# Patient Record
Sex: FEMALE | Race: WHITE | NOT HISPANIC OR LATINO | Employment: UNEMPLOYED | URBAN - METROPOLITAN AREA
[De-identification: names, ages, dates, MRNs, and addresses within clinical notes are randomized per-mention and may not be internally consistent; named-entity substitution may affect disease eponyms.]

---

## 2017-11-13 ENCOUNTER — CONVERSION ENCOUNTER (OUTPATIENT)
Dept: URGENT CARE | Facility: CLINIC | Age: 22
End: 2017-11-13

## 2017-12-11 ENCOUNTER — CONVERSION ENCOUNTER (OUTPATIENT)
Dept: URGENT CARE | Facility: CLINIC | Age: 22
End: 2017-12-11

## 2019-06-04 VITALS
RESPIRATION RATE: 16 BRPM | HEIGHT: 60 IN | BODY MASS INDEX: 24.15 KG/M2 | HEART RATE: 77 BPM | SYSTOLIC BLOOD PRESSURE: 104 MMHG | SYSTOLIC BLOOD PRESSURE: 112 MMHG | BODY MASS INDEX: 23.16 KG/M2 | WEIGHT: 118 LBS | OXYGEN SATURATION: 99 % | RESPIRATION RATE: 18 BRPM | DIASTOLIC BLOOD PRESSURE: 70 MMHG | HEIGHT: 60 IN | WEIGHT: 123 LBS | HEART RATE: 67 BPM | DIASTOLIC BLOOD PRESSURE: 72 MMHG | OXYGEN SATURATION: 99 %

## 2019-12-11 ENCOUNTER — TRANSCRIBE ORDERS (OUTPATIENT)
Dept: ADMINISTRATIVE | Facility: HOSPITAL | Age: 24
End: 2019-12-11

## 2019-12-11 DIAGNOSIS — R10.2 FEMALE PELVIC PAIN: Primary | ICD-10-CM

## 2019-12-13 ENCOUNTER — HOSPITAL ENCOUNTER (OUTPATIENT)
Dept: CT IMAGING | Facility: HOSPITAL | Age: 24
Discharge: HOME | End: 2019-12-13
Admitting: OBSTETRICS & GYNECOLOGY

## 2019-12-13 DIAGNOSIS — R10.2 FEMALE PELVIC PAIN: ICD-10-CM

## 2019-12-13 PROCEDURE — 74177 CT ABD & PELVIS W/CONTRAST: CPT

## 2019-12-13 PROCEDURE — 0 IOPAMIDOL PER 1 ML: Performed by: OBSTETRICS & GYNECOLOGY

## 2019-12-13 RX ADMIN — IOPAMIDOL 100 ML: 755 INJECTION, SOLUTION INTRAVENOUS at 09:25

## 2020-11-07 ENCOUNTER — APPOINTMENT (OUTPATIENT)
Dept: ULTRASOUND IMAGING | Facility: HOSPITAL | Age: 25
End: 2020-11-07

## 2020-11-07 ENCOUNTER — HOSPITAL ENCOUNTER (EMERGENCY)
Facility: HOSPITAL | Age: 25
Discharge: HOME | End: 2020-11-07
Attending: EMERGENCY MEDICINE | Admitting: EMERGENCY MEDICINE

## 2020-11-07 VITALS
RESPIRATION RATE: 16 BRPM | HEIGHT: 60 IN | TEMPERATURE: 98.2 F | DIASTOLIC BLOOD PRESSURE: 62 MMHG | BODY MASS INDEX: 26.01 KG/M2 | OXYGEN SATURATION: 98 % | SYSTOLIC BLOOD PRESSURE: 110 MMHG | HEART RATE: 90 BPM | WEIGHT: 132.5 LBS

## 2020-11-07 DIAGNOSIS — Z3A.01 LESS THAN 8 WEEKS GESTATION OF PREGNANCY: Primary | ICD-10-CM

## 2020-11-07 DIAGNOSIS — O20.9 ANTEPARTUM BLEEDING, FIRST TRIMESTER: ICD-10-CM

## 2020-11-07 LAB
ABO GROUP BLD: NORMAL
BASOPHILS # BLD AUTO: 0 10*3/MM3 (ref 0–0.2)
BASOPHILS NFR BLD AUTO: 0.4 % (ref 0–1.5)
BILIRUB UR QL STRIP: NEGATIVE
CLARITY UR: CLEAR
COLOR UR: YELLOW
DEPRECATED RDW RBC AUTO: 46.4 FL (ref 37–54)
EOSINOPHIL # BLD AUTO: 0.2 10*3/MM3 (ref 0–0.4)
EOSINOPHIL NFR BLD AUTO: 2.1 % (ref 0.3–6.2)
ERYTHROCYTE [DISTWIDTH] IN BLOOD BY AUTOMATED COUNT: 15.2 % (ref 12.3–15.4)
GLUCOSE UR STRIP-MCNC: NEGATIVE MG/DL
HCG INTACT+B SERPL-ACNC: NORMAL MIU/ML
HCT VFR BLD AUTO: 37.6 % (ref 34–46.6)
HGB BLD-MCNC: 12.6 G/DL (ref 12–15.9)
HGB UR QL STRIP.AUTO: NEGATIVE
KETONES UR QL STRIP: NEGATIVE
LEUKOCYTE ESTERASE UR QL STRIP.AUTO: NEGATIVE
LYMPHOCYTES # BLD AUTO: 1.8 10*3/MM3 (ref 0.7–3.1)
LYMPHOCYTES NFR BLD AUTO: 21.9 % (ref 19.6–45.3)
MCH RBC QN AUTO: 29.6 PG (ref 26.6–33)
MCHC RBC AUTO-ENTMCNC: 33.6 G/DL (ref 31.5–35.7)
MCV RBC AUTO: 88.1 FL (ref 79–97)
MONOCYTES # BLD AUTO: 0.5 10*3/MM3 (ref 0.1–0.9)
MONOCYTES NFR BLD AUTO: 5.7 % (ref 5–12)
NEUTROPHILS NFR BLD AUTO: 5.6 10*3/MM3 (ref 1.7–7)
NEUTROPHILS NFR BLD AUTO: 69.9 % (ref 42.7–76)
NITRITE UR QL STRIP: NEGATIVE
NRBC BLD AUTO-RTO: 0 /100 WBC (ref 0–0.2)
PH UR STRIP.AUTO: 6.5 [PH] (ref 5–8)
PLATELET # BLD AUTO: 242 10*3/MM3 (ref 140–450)
PMV BLD AUTO: 9.2 FL (ref 6–12)
PROT UR QL STRIP: NEGATIVE
RBC # BLD AUTO: 4.27 10*6/MM3 (ref 3.77–5.28)
RH BLD: POSITIVE
SP GR UR STRIP: 1.03 (ref 1–1.03)
UROBILINOGEN UR QL STRIP: NORMAL
WBC # BLD AUTO: 8.1 10*3/MM3 (ref 3.4–10.8)

## 2020-11-07 PROCEDURE — 76817 TRANSVAGINAL US OBSTETRIC: CPT

## 2020-11-07 PROCEDURE — 84702 CHORIONIC GONADOTROPIN TEST: CPT | Performed by: EMERGENCY MEDICINE

## 2020-11-07 PROCEDURE — 76815 OB US LIMITED FETUS(S): CPT

## 2020-11-07 PROCEDURE — 86901 BLOOD TYPING SEROLOGIC RH(D): CPT | Performed by: EMERGENCY MEDICINE

## 2020-11-07 PROCEDURE — 93976 VASCULAR STUDY: CPT

## 2020-11-07 PROCEDURE — 85025 COMPLETE CBC W/AUTO DIFF WBC: CPT | Performed by: EMERGENCY MEDICINE

## 2020-11-07 PROCEDURE — P9612 CATHETERIZE FOR URINE SPEC: HCPCS

## 2020-11-07 PROCEDURE — 86900 BLOOD TYPING SEROLOGIC ABO: CPT | Performed by: EMERGENCY MEDICINE

## 2020-11-07 PROCEDURE — 81003 URINALYSIS AUTO W/O SCOPE: CPT | Performed by: EMERGENCY MEDICINE

## 2020-11-07 PROCEDURE — 99284 EMERGENCY DEPT VISIT MOD MDM: CPT

## 2020-11-07 NOTE — ED NOTES
Pt has noticed brownish red vaginal discharge when she wipes since Oct 23.   Pt has has had 2 blood draws for HCG and last around 7000.  Pt has appt w/ OBGYN 11-16-20.      Annalisa Munguia, LPN  11/07/20 1432

## 2020-11-07 NOTE — ED PROVIDER NOTES
Subjective   History of Present Illness  25-year-old female  2 para 1 Ab0 currently thinks that she is about 6 weeks pregnant and complains of pain in the right adnexal area as well as some spotting.  The patient denies any dysuria or hematuria.  She states that she has been told previously that she had a right ovarian cyst.  Patient denies any vomiting.  Review of Systems  Negative other than noted above  No past medical history on file.    No Known Allergies    No past surgical history on file.    No family history on file.    Social History     Socioeconomic History   • Marital status:      Spouse name: Not on file   • Number of children: Not on file   • Years of education: Not on file   • Highest education level: Not on file           Objective   Physical Exam  Awake and alert with stable vital signs the HEENT exam is unremarkable cardiovascular exam reveals a regular rhythm her abdomen was nondistended bowel sounds are positive I do not feel any uterine enlargement she has some tenderness in the right lower quadrant just above the inguinal ligament no hernias present she has normal external female genitalia there is a scant amount of dark blood noted in the vault cervix is closed I do not feel any uterine enlargement I do not feel any adnexal masses but she has some tenderness over the right.  There is no CVAT.  Procedures           ED Course           Results for orders placed or performed during the hospital encounter of 20   hCG, Quantitative, Pregnancy    Specimen: Blood   Result Value Ref Range    HCG Quantitative 20,279.00 mIU/mL   Urinalysis With Culture If Indicated - Urine, Catheter In/Out    Specimen: Urine, Catheter In/Out   Result Value Ref Range    Color, UA Yellow Yellow, Straw    Appearance, UA Clear Clear    pH, UA 6.5 5.0 - 8.0    Specific Gravity, UA 1.027 1.005 - 1.030    Glucose, UA Negative Negative    Ketones, UA Negative Negative    Bilirubin, UA Negative Negative     Blood, UA Negative Negative    Protein, UA Negative Negative    Leuk Esterase, UA Negative Negative    Nitrite, UA Negative Negative    Urobilinogen, UA 1.0 E.U./dL 0.2 - 1.0 E.U./dL   CBC Auto Differential    Specimen: Blood   Result Value Ref Range    WBC 8.10 3.40 - 10.80 10*3/mm3    RBC 4.27 3.77 - 5.28 10*6/mm3    Hemoglobin 12.6 12.0 - 15.9 g/dL    Hematocrit 37.6 34.0 - 46.6 %    MCV 88.1 79.0 - 97.0 fL    MCH 29.6 26.6 - 33.0 pg    MCHC 33.6 31.5 - 35.7 g/dL    RDW 15.2 12.3 - 15.4 %    RDW-SD 46.4 37.0 - 54.0 fl    MPV 9.2 6.0 - 12.0 fL    Platelets 242 140 - 450 10*3/mm3    Neutrophil % 69.9 42.7 - 76.0 %    Lymphocyte % 21.9 19.6 - 45.3 %    Monocyte % 5.7 5.0 - 12.0 %    Eosinophil % 2.1 0.3 - 6.2 %    Basophil % 0.4 0.0 - 1.5 %    Neutrophils, Absolute 5.60 1.70 - 7.00 10*3/mm3    Lymphocytes, Absolute 1.80 0.70 - 3.10 10*3/mm3    Monocytes, Absolute 0.50 0.10 - 0.90 10*3/mm3    Eosinophils, Absolute 0.20 0.00 - 0.40 10*3/mm3    Basophils, Absolute 0.00 0.00 - 0.20 10*3/mm3    nRBC 0.0 0.0 - 0.2 /100 WBC   ABO / Rh    Specimen: Blood   Result Value Ref Range    ABO Type O     RH type Positive      Medications - No data to display  Us Ob Limited 1 + Fetuses    Result Date: 11/7/2020  A small amount of free fluid within the right adnexal region likely related to mild cystic structure. Otherwise, no acute process. There is a viable intrauterine gestation corresponding to gestational age of 6 weeks and 1 day with a normal fetal heart rate of 141 bpm. No suspicious fluid collection surrounding the gestational sac.  Electronically Signed By-Kristina Jeffrey On:11/7/2020 3:47 PM This report was finalized on [id number] by  Kristina Jeffrey .    Us Ob Transvaginal    Result Date: 11/7/2020  A small amount of free fluid within the right adnexal region likely related to mild cystic structure. Otherwise, no acute process. There is a viable intrauterine gestation corresponding to gestational age of 6 weeks and 1 day with  a normal fetal heart rate of 141 bpm. No suspicious fluid collection surrounding the gestational sac.  Electronically Signed By-Kristina Jeffrey On:11/7/2020 3:47 PM This report was finalized on [id number] by  Kristina Jeffrey, .                                    MDM  Patient's ultrasound shows a 6-week 1 day intrauterine pregnancy with a fetal heart rate of 141.  The hCG is 20,279 which is appropriate blood type is O+.  Patient did have evidence of some free fluid in the right adnexal region.  She has not appointment to follow-up with her obstetrician and approximately 1 week.  Final diagnoses:   Less than 8 weeks gestation of pregnancy   Antepartum bleeding, first trimester            Chavo Manzano MD  11/07/20 3718

## 2020-11-16 LAB
EXTERNAL RUBELLA QUALITATIVE: NORMAL
EXTERNAL SYPHILIS RPR SCREEN: NORMAL

## 2021-06-10 LAB — EXTERNAL GROUP B STREP ANTIGEN: NORMAL

## 2021-07-07 ENCOUNTER — ANESTHESIA EVENT (OUTPATIENT)
Dept: LABOR AND DELIVERY | Facility: HOSPITAL | Age: 26
End: 2021-07-07

## 2021-07-07 ENCOUNTER — ANESTHESIA (OUTPATIENT)
Dept: LABOR AND DELIVERY | Facility: HOSPITAL | Age: 26
End: 2021-07-07

## 2021-07-07 ENCOUNTER — HOSPITAL ENCOUNTER (INPATIENT)
Facility: HOSPITAL | Age: 26
LOS: 1 days | Discharge: HOME | End: 2021-07-08
Attending: OBSTETRICS & GYNECOLOGY | Admitting: OBSTETRICS & GYNECOLOGY

## 2021-07-07 PROBLEM — Z34.90 PREGNANT: Status: ACTIVE | Noted: 2021-07-07

## 2021-07-07 LAB
ABO GROUP BLD: NORMAL
BLD GP AB SCN SERPL QL: NEGATIVE
DEPRECATED RDW RBC AUTO: 48.6 FL (ref 37–54)
ERYTHROCYTE [DISTWIDTH] IN BLOOD BY AUTOMATED COUNT: 15 % (ref 12.3–15.4)
HCT VFR BLD AUTO: 38.5 % (ref 34–46.6)
HGB BLD-MCNC: 12.9 G/DL (ref 12–15.9)
HIV1+2 AB SER QL: NORMAL
MCH RBC QN AUTO: 31.4 PG (ref 26.6–33)
MCHC RBC AUTO-ENTMCNC: 33.6 G/DL (ref 31.5–35.7)
MCV RBC AUTO: 93.6 FL (ref 79–97)
PLATELET # BLD AUTO: 186 10*3/MM3 (ref 140–450)
PMV BLD AUTO: 9.1 FL (ref 6–12)
RBC # BLD AUTO: 4.11 10*6/MM3 (ref 3.77–5.28)
RH BLD: POSITIVE
RPR SER QL: NORMAL
SARS-COV-2 RNA PNL SPEC NAA+PROBE: NOT DETECTED
T&S EXPIRATION DATE: NORMAL
WBC # BLD AUTO: 13.8 10*3/MM3 (ref 3.4–10.8)

## 2021-07-07 PROCEDURE — 86900 BLOOD TYPING SEROLOGIC ABO: CPT | Performed by: OBSTETRICS & GYNECOLOGY

## 2021-07-07 PROCEDURE — 86901 BLOOD TYPING SEROLOGIC RH(D): CPT | Performed by: OBSTETRICS & GYNECOLOGY

## 2021-07-07 PROCEDURE — 10907ZC DRAINAGE OF AMNIOTIC FLUID, THERAPEUTIC FROM PRODUCTS OF CONCEPTION, VIA NATURAL OR ARTIFICIAL OPENING: ICD-10-PCS | Performed by: OBSTETRICS & GYNECOLOGY

## 2021-07-07 PROCEDURE — C1755 CATHETER, INTRASPINAL: HCPCS | Performed by: ANESTHESIOLOGY

## 2021-07-07 PROCEDURE — 86850 RBC ANTIBODY SCREEN: CPT | Performed by: OBSTETRICS & GYNECOLOGY

## 2021-07-07 PROCEDURE — 86592 SYPHILIS TEST NON-TREP QUAL: CPT | Performed by: OBSTETRICS & GYNECOLOGY

## 2021-07-07 PROCEDURE — 85027 COMPLETE CBC AUTOMATED: CPT | Performed by: OBSTETRICS & GYNECOLOGY

## 2021-07-07 PROCEDURE — G0432 EIA HIV-1/HIV-2 SCREEN: HCPCS | Performed by: OBSTETRICS & GYNECOLOGY

## 2021-07-07 PROCEDURE — 87635 SARS-COV-2 COVID-19 AMP PRB: CPT | Performed by: OBSTETRICS & GYNECOLOGY

## 2021-07-07 RX ORDER — IBUPROFEN 600 MG/1
600 TABLET ORAL EVERY 6 HOURS PRN
Status: DISCONTINUED | OUTPATIENT
Start: 2021-07-07 | End: 2021-07-08 | Stop reason: HOSPADM

## 2021-07-07 RX ORDER — HYDROCORTISONE ACETATE PRAMOXINE HCL 2.5; 1 G/100G; G/100G
1 CREAM TOPICAL AS NEEDED
Status: DISCONTINUED | OUTPATIENT
Start: 2021-07-07 | End: 2021-07-08 | Stop reason: HOSPADM

## 2021-07-07 RX ORDER — AMMONIA INHALANTS 0.04 G/.3ML
1 INHALANT RESPIRATORY (INHALATION) ONCE AS NEEDED
Status: DISCONTINUED | OUTPATIENT
Start: 2021-07-07 | End: 2021-07-07

## 2021-07-07 RX ORDER — FENTANYL 0.2 MG/100ML-BUPIV 0.125%-NACL 0.9% EPIDURAL INJ 2/0.125 MCG/ML-%
SOLUTION INJECTION
Status: COMPLETED
Start: 2021-07-07 | End: 2021-07-07

## 2021-07-07 RX ORDER — DOCUSATE SODIUM 100 MG/1
100 CAPSULE, LIQUID FILLED ORAL 2 TIMES DAILY
Status: DISCONTINUED | OUTPATIENT
Start: 2021-07-07 | End: 2021-07-08 | Stop reason: HOSPADM

## 2021-07-07 RX ORDER — MISOPROSTOL 200 UG/1
800 TABLET ORAL AS NEEDED
Status: DISCONTINUED | OUTPATIENT
Start: 2021-07-07 | End: 2021-07-07 | Stop reason: HOSPADM

## 2021-07-07 RX ORDER — PRENATAL VIT/IRON FUM/FOLIC AC 27MG-0.8MG
1 TABLET ORAL DAILY
COMMUNITY

## 2021-07-07 RX ORDER — PRENATAL VIT/IRON FUM/FOLIC AC 27MG-0.8MG
1 TABLET ORAL DAILY
Status: DISCONTINUED | OUTPATIENT
Start: 2021-07-07 | End: 2021-07-08 | Stop reason: HOSPADM

## 2021-07-07 RX ORDER — MAGNESIUM CARB/ALUMINUM HYDROX 105-160MG
30 TABLET,CHEWABLE ORAL ONCE AS NEEDED
Status: DISCONTINUED | OUTPATIENT
Start: 2021-07-07 | End: 2021-07-07 | Stop reason: HOSPADM

## 2021-07-07 RX ORDER — SODIUM CHLORIDE, SODIUM LACTATE, POTASSIUM CHLORIDE, CALCIUM CHLORIDE 600; 310; 30; 20 MG/100ML; MG/100ML; MG/100ML; MG/100ML
125 INJECTION, SOLUTION INTRAVENOUS CONTINUOUS
Status: DISCONTINUED | OUTPATIENT
Start: 2021-07-07 | End: 2021-07-07

## 2021-07-07 RX ORDER — ONDANSETRON 4 MG/1
4 TABLET, FILM COATED ORAL EVERY 8 HOURS PRN
Status: DISCONTINUED | OUTPATIENT
Start: 2021-07-07 | End: 2021-07-08 | Stop reason: HOSPADM

## 2021-07-07 RX ORDER — SODIUM CHLORIDE 0.9 % (FLUSH) 0.9 %
10 SYRINGE (ML) INJECTION EVERY 12 HOURS SCHEDULED
Status: DISCONTINUED | OUTPATIENT
Start: 2021-07-07 | End: 2021-07-07 | Stop reason: HOSPADM

## 2021-07-07 RX ORDER — BISACODYL 10 MG
10 SUPPOSITORY, RECTAL RECTAL DAILY PRN
Status: DISCONTINUED | OUTPATIENT
Start: 2021-07-08 | End: 2021-07-08 | Stop reason: HOSPADM

## 2021-07-07 RX ORDER — METHYLERGONOVINE MALEATE 0.2 MG/ML
200 INJECTION INTRAVENOUS ONCE AS NEEDED
Status: DISCONTINUED | OUTPATIENT
Start: 2021-07-07 | End: 2021-07-07 | Stop reason: HOSPADM

## 2021-07-07 RX ORDER — OXYTOCIN-SODIUM CHLORIDE 0.9% IV SOLN 30 UNIT/500ML 30-0.9/5 UT/ML-%
999 SOLUTION INTRAVENOUS ONCE
Status: COMPLETED | OUTPATIENT
Start: 2021-07-07 | End: 2021-07-07

## 2021-07-07 RX ORDER — CARBOPROST TROMETHAMINE 250 UG/ML
250 INJECTION, SOLUTION INTRAMUSCULAR AS NEEDED
Status: DISCONTINUED | OUTPATIENT
Start: 2021-07-07 | End: 2021-07-07 | Stop reason: HOSPADM

## 2021-07-07 RX ORDER — FENTANYL 0.2 MG/100ML-BUPIV 0.125%-NACL 0.9% EPIDURAL INJ 2/0.125 MCG/ML-%
SOLUTION INJECTION CONTINUOUS
Status: DISCONTINUED | OUTPATIENT
Start: 2021-07-07 | End: 2021-07-07

## 2021-07-07 RX ORDER — FERROUS SULFATE 325(65) MG
325 TABLET ORAL
COMMUNITY

## 2021-07-07 RX ORDER — OXYTOCIN-SODIUM CHLORIDE 0.9% IV SOLN 30 UNIT/500ML 30-0.9/5 UT/ML-%
125 SOLUTION INTRAVENOUS CONTINUOUS PRN
Status: DISCONTINUED | OUTPATIENT
Start: 2021-07-07 | End: 2021-07-07 | Stop reason: HOSPADM

## 2021-07-07 RX ORDER — LANOLIN 100 %
OINTMENT (GRAM) TOPICAL
Status: DISCONTINUED | OUTPATIENT
Start: 2021-07-07 | End: 2021-07-08 | Stop reason: HOSPADM

## 2021-07-07 RX ORDER — LIDOCAINE HYDROCHLORIDE 10 MG/ML
30 INJECTION, SOLUTION EPIDURAL; INFILTRATION; INTRACAUDAL; PERINEURAL ONCE AS NEEDED
Status: COMPLETED | OUTPATIENT
Start: 2021-07-07 | End: 2021-07-07

## 2021-07-07 RX ORDER — OXYTOCIN-SODIUM CHLORIDE 0.9% IV SOLN 30 UNIT/500ML 30-0.9/5 UT/ML-%
250 SOLUTION INTRAVENOUS CONTINUOUS
Status: DISPENSED | OUTPATIENT
Start: 2021-07-07 | End: 2021-07-07

## 2021-07-07 RX ORDER — SODIUM CHLORIDE 0.9 % (FLUSH) 0.9 %
1-10 SYRINGE (ML) INJECTION AS NEEDED
Status: DISCONTINUED | OUTPATIENT
Start: 2021-07-07 | End: 2021-07-08 | Stop reason: HOSPADM

## 2021-07-07 RX ADMIN — SODIUM CHLORIDE, POTASSIUM CHLORIDE, SODIUM LACTATE AND CALCIUM CHLORIDE 125 ML/HR: 600; 310; 30; 20 INJECTION, SOLUTION INTRAVENOUS at 10:31

## 2021-07-07 RX ADMIN — OXYTOCIN 999 ML/HR: 10 INJECTION INTRAVENOUS at 14:56

## 2021-07-07 RX ADMIN — IBUPROFEN 600 MG: 600 TABLET, FILM COATED ORAL at 18:34

## 2021-07-07 RX ADMIN — SODIUM CHLORIDE, POTASSIUM CHLORIDE, SODIUM LACTATE AND CALCIUM CHLORIDE 125 ML/HR: 600; 310; 30; 20 INJECTION, SOLUTION INTRAVENOUS at 12:56

## 2021-07-07 RX ADMIN — OXYTOCIN 125 ML/HR: 10 INJECTION INTRAVENOUS at 16:30

## 2021-07-07 RX ADMIN — WITCH HAZEL: 500 SOLUTION RECTAL; TOPICAL at 15:53

## 2021-07-07 RX ADMIN — DOCUSATE SODIUM 100 MG: 100 CAPSULE, LIQUID FILLED ORAL at 21:20

## 2021-07-07 RX ADMIN — LIDOCAINE HYDROCHLORIDE 30 ML: 10 INJECTION, SOLUTION EPIDURAL; INFILTRATION; INTRACAUDAL; PERINEURAL at 14:40

## 2021-07-07 RX ADMIN — Medication 1 ML/HR: at 11:14

## 2021-07-07 NOTE — ANESTHESIA PROCEDURE NOTES
"Labor Epidural    Pre-sedation assessment completed: 7/7/2021 10:43 AM    Patient reassessed immediately prior to procedure    Patient location during procedure: OB  Start Time: 7/7/2021 10:43 AM  Performed By  Anesthesiologist: Shawn Prather MD  Preanesthetic Checklist  Completed: patient identified, IV checked, site marked, risks and benefits discussed, surgical consent, monitors and equipment checked, pre-op evaluation and timeout performed  Additional Notes  Tuohy Needle slipped during placement, MAHNAZ at 7 cm.  \"pop\" felt during epidural catheter placement.  CSF returned via catheter spontaneously and with syringe drawback.  Pt in severe labor pain.  1 ML test dose given.  Catheter left as intrathecal catheter.  RN and patient informed and educated about dural puncture and possible PDPH.  Will leave catheter in after delivery and infuse NS overnight.  No immediate c/o headache.  Prep:  Pt Position:sitting  Sterile Tech:gloves, cap, sterile barrier and mask  Prep:povidone-iodine 7.5% surgical scrub  Monitoring:continuous pulse oximetry, EKG and blood pressure monitoring  Epidural Block Procedure:  Approach:midline  Guidance:landmark technique and palpation technique  Location:L3-L4  Needle Type:Tuohy  Needle Gauge:17 G  Loss of Resistance Medium: saline  Loss of Resistance: 7cm  Cath Depth at skin:12 cm  Paresthesia: none  Aspiration:positive  Test Dose:positive  Med administered at 7/7/2021 10:55 AM  Number of Attempts: 1  Post Assessment:  Dressing:secured with tape and occlusive dressing applied  Pt Tolerance:patient tolerated the procedure well with no apparent complications  Complications:yes            "

## 2021-07-07 NOTE — L&D DELIVERY NOTE
[facility]  Vaginal Delivery Note    Diagnosis     Patient is a 26 y.o. female  currently at 40w3d, who presents with labor.      Delivery     Delivery:  Spontaneous Vaginal Delivery    Date of Delivery:  2021   Anesthesia: Epidural     Delivering clinician: Sheila Motley MD      Delivery narrative: This patient presented to labor and delivery in term labor.  She is GBS negative.  She had reactive category 1 tracing throughout her course.  She received an epidural at 4-1/2 cm.  The epidural did not work well.  She progressed to 7 cm was artificially ruptured and had clear fluid.  Once complete complete +2 station with the urge to push she pushed with excellent effort delivered once a second-degree episiotomy was cut a live viable female infant with Apgars of 9 and 9 at 1 and 5 minutes.  The infant had good cry color and tone was moving all 4 extremities upon delivery.  Infant was bulb suctioned after complete delivery cord was clamped and cut the infant was placed in mother's abdomen.  Delayed cord clamping was performed.  Cord blood was sent for analysis, Pitocin was given IV.  The placenta delivered spontaneously intact with a three-vessel cord was set aside.  Uterus cervix and vagina were explored and second-degree episiotomy had not extended and was repaired with 3-0 Vicryl in the normal fashion.  The patient tolerated the procedure well sponge lap needle counts were correct the fundus was firm and hemostasis noted at the end of the case mom and baby are doing well at the time of this dictation.    Infant    Findings: VFI     Apgars:  9 and 9 at 1 and 5 minutes.      Placenta, Cord, and Fluid    Placenta delivered  spontaneous  3VC          Lacerations       had a 2nd degree lacteration, which was repair. with 3.0 Vicryl rapide in the usual fashion.     Estimated Blood Loss 50cc     Complications  none    Disposition  Mother to Mother Baby/Postpartum  in stable condition currently.  Baby to  remains with mom  in stable condition currently.      Sheila Motley MD  07/07/21  15:03 EDT

## 2021-07-07 NOTE — H&P
[facility]  Obstetric History and Physical     Chief Complaint: Labor    Subjective     Patient is a 26 y.o. female  currently at 40w3d, who presents with labor pregnancy uncomplicated.    Her prenatal care is benign.  Her previous obstetric/gynecological history is noted for is non-contributory.      Prenatal Information:  Prenatal Results     POC Urine Glucose/Protein     Test Value Reference Range Date Time    Urine Glucose        Urine Protein              Initial Prenatal Labs     Test Value Reference Range Date Time    Hemoglobin  12.6 g/dL 12.0 - 15.9 20 1509    Hematocrit  37.6 % 34.0 - 46.6 20 1509    Platelets  186 10*3/mm3 140 - 450 21 1028       242 10*3/mm3 140 - 450 20 1509    Rubella IgG ^ Immune   20     Hepatitis B SAg        Hepatitis C Ab        RPR ^ Non-Reactive   20     ABO  O   21 1028    Rh  Positive   21 1028    Antibody Screen        HIV  Non-Reactive  Non-Reactive 21 1028    Urine Culture        Gonorrhea        Chlamydia        TSH              2nd and 3rd Trimester     Test Value Reference Range Date Time    Hemoglobin (repeated)  12.9 g/dL 12.0 - 15.9 21 1028    Hematocrit (repeated)  38.5 % 34.0 - 46.6 21 1028    GCT        Antibody Screen (repeated)  Negative   21 1028    GTT Fasting        GTT 1 Hr        GTT 2 Hr        GTT 3 Hr        Group B Strep ^ NEG   06/10/21           Drug Screening     Test Value Reference Range Date Time    Amphetamine Screen        Barbiturate Screen        Benzodiazepine Screen        Methadone Screen        Phencyclidine Screen        Opiates Screen        THC Screen        Cocaine Screen        Propoxyphene Screen        Buprenorphine Screen        Methamphetamine Screen        Oxycodone Screen        Tricyclic Antidepressants Screen              Other (Risk screening)     Test Value Reference Range Date Time    Varicella IgG        Parvovirus IgG        CMV IgG        Cystic  Fibrosis        Hemoglobin electrophoresis        NIPT        MSAFP-4        AFP (for NTD only)              Legend    ^: Historical                      External Prenatal Results     Pregnancy Outside Results - Transcribed From Office Records - See Scanned Records For Details     Test Value Date Time    ABO  O  21 1028    Rh  Positive  21 1028    Antibody Screen  Negative  21 1028    Varicella IgG       Rubella ^ Immune  20     Hgb  12.9 g/dL 21 1028       12.6 g/dL 20 1509    Hct  38.5 % 21 1028       37.6 % 20 1509    Glucose Fasting GTT       Glucose Tolerance Test 1 hour       Glucose Tolerance Test 3 hour       Gonorrhea (discrete)       Chlamydia (discrete)       RPR ^ Non-Reactive  20     VDRL       Syphilis Antibody       HBsAg       Herpes Simplex Virus PCR       Herpes Simplex VIrus Culture       HIV  Non-Reactive  21 1028    Hep C RNA Quant PCR       Hep C Antibody       AFP       Group B Strep ^ NEG  06/10/21     GBS Susceptibility to Clindamycin       GBS Susceptibility to Erythromycin       Fetal Fibronectin       Genetic Testing, Maternal Blood             Drug Screening     Test Value Date Time    Urine Drug Screen       Amphetamine Screen       Barbiturate Screen       Benzodiazepine Screen       Methadone Screen       Phencyclidine Screen       Opiates Screen       THC Screen       Cocaine Screen       Propoxyphene Screen       Buprenorphine Screen       Methamphetamine Screen       Oxycodone Screen       Tricyclic Antidepressants Screen             Legend    ^: Historical                         Past OB History:        Past Medical History: History reviewed. No pertinent past medical history.     Past Surgical History History reviewed. No pertinent surgical history.     Family History: No family history on file.   Social History:  reports that she has never smoked. She has never used smokeless tobacco.  Alcohol use questions  deferred to the physician.   reports no history of drug use.        General ROS: Pertinent items are noted in HPI    Objective      Vitals:     Vitals:    07/07/21 1200 07/07/21 1215 07/07/21 1230 07/07/21 1300   BP: 109/61 103/67 106/67 100/65   BP Location:       Patient Position:       Pulse: 103 81 81 83   Resp:       Temp:       TempSrc:       SpO2: 100% 100% 100%    Weight:       Height:           Fetal Heart Rate Assessment:   Category 1    [place]:   Every 2 to 3 minutes     Physical Exam:     General Appearance:    Alert, cooperative, in no acute distress   Lungs:     Clear to auscultation,respirations regular.    Heart:    Regular rhythm and normal rate.   Breast Exam:    Deferred   Abdomen:     Normal bowel sounds, no masses, soft non-tender,         non-distended, no guarding, no rebound tenderness   Pelvic Exam:   7 cm/complete, +1 station artificial rupture membranes complete without difficulty clear fluid return fetus and mother tolerated well.    Presentation: Cephalic    Cervix:    Extremities:   Moves all extremities well, no edema, no cyanosis, no              redness   Skin:   No bleeding, bruising or rash   Neurologic:   No focal neurologic defect          Laboratory Results:   Lab Results (last 48 hours)     Procedure Component Value Units Date/Time    Rubella Antibody, IgG [[id number]] Resulted: 11/16/20    Specimen: Blood Updated: 07/07/21 1133     External Rubella Qual Immune    Group B Streptococcus Culture - Swab, Vaginal/Rectum [[id number]] Resulted: 06/10/21    Specimen: Swab from Vaginal/Rectum Updated: 07/07/21 1133     External Strep Group B Ag NEG    RPR [[id number]] Resulted: 11/16/20    Specimen: Blood Updated: 07/07/21 1133     External RPR Non-Reactive    HIV-1 & HIV-2 Antibodies [[id number]]  (Normal) Collected: 07/07/21 1028    Specimen: Blood Updated: 07/07/21 1115    Narrative:      The following orders were created for panel order HIV-1 & HIV-2 Antibodies.  Procedure                                Abnormality         Status                     ---------                               -----------         ------                     HIV-1 / O / 2 Ag / Antib...[[id number]]  Normal              Final result                 Please view results for these tests on the individual orders.    HIV-1 / O / 2 Ag / Antibody 4th Generation [[id number]]  (Normal) Collected: 07/07/21 1028    Specimen: Blood Updated: 07/07/21 1115     HIV-1/ HIV-2 Non-Reactive     Comment: A non-reactive test result does not preclude the possibility of exposure to HIV or infection with HIV. An antibody response to recent exposure may take several months to reach detectable levels.       Narrative:      The HIV antibody/antigen combo assay is a qualitative assay for HIV that includes the p24 antigen as well as antibodies to HIV types 1 and 2. This test is intended to be used as a screening assay in the diagnosis of HIV infection in patients over the age of 2.  Results may be falsely decreased if patient taking Biotin.      COVID PRE-OP / PRE-PROCEDURE SCREENING ORDER (NO ISOLATION) - Swab, Nasopharynx [[id number]]  (Normal) Collected: 07/07/21 1028    Specimen: Swab from Nasopharynx Updated: 07/07/21 1100    Narrative:      The following orders were created for panel order COVID PRE-OP / PRE-PROCEDURE SCREENING ORDER (NO ISOLATION) - Swab, Nasopharynx.  Procedure                               Abnormality         Status                     ---------                               -----------         ------                     COVID-19,CEPHEID,COR/RONNIE...[[id number]]  Normal              Final result                 Please view results for these tests on the individual orders.    COVID-19,CEPHEID,COR/RONNIE/PAD/TREY IN-HOUSE(OR EMERGENT/ADD-ON),NP SWAB IN TRANSPORT MEDIA 3-4 HR TAT, RT-PCR - Swab, Nasopharynx [[id number]]  (Normal) Collected: 07/07/21 1028    Specimen: Swab from Nasopharynx Updated: 07/07/21 1100     COVID19 Not Detected     Narrative:      Fact sheet for providers: https://www.fda.gov/media/531417/download     Fact sheet for patients: https://www.fda.gov/media/258404/download  Fact sheet for providers: https://www.fda.gov/media/826164/download    Fact sheet for patients: https://www.fda.gov/media/516656/download    Test performed by PCR.    CBC (No Diff) [[id number]]  (Abnormal) Collected: 07/07/21 1028    Specimen: Blood Updated: 07/07/21 1038     WBC 13.80 10*3/mm3      RBC 4.11 10*6/mm3      Hemoglobin 12.9 g/dL      Hematocrit 38.5 %      MCV 93.6 fL      MCH 31.4 pg      MCHC 33.6 g/dL      RDW 15.0 %      RDW-SD 48.6 fl      MPV 9.1 fL      Platelets 186 10*3/mm3     RPR [[id number]] Collected: 07/07/21 1028    Specimen: Blood Updated: 07/07/21 1035          Other Studies:      Assessment/Plan     Active Problems:    Pregnant         Assessment:  1.  Intrauterine pregnancy at 40w3d gestation with reactive fetal status.    2.  labor  with ROM   3.  Obstetrical history significant for is non-contributory.  4.  GBS status:   External Strep Group B Ag   Date Value Ref Range Status   06/10/2021 NEG  Final       Plan:  1. Vaginal anticipated  2. Plan of care has been reviewed with patient.  3.  Risks, benefits of treatment plan have been discussed.  4.  All questions have been answered.  5.  Epidural in place continue to monitor.       Sheila Motley MD   7/7/2021   13:18 EDT

## 2021-07-07 NOTE — PLAN OF CARE
Goal Outcome Evaluation:  Plan of Care Reviewed With: patient        Progress: improving  Outcome Summary: Patient has effective epidural. RN encourage patient to ask any questions/concerns.

## 2021-07-07 NOTE — ANESTHESIA PREPROCEDURE EVALUATION
Anesthesia Evaluation     Patient summary reviewed and Nursing notes reviewed                Airway   Mallampati: II  TM distance: >3 FB  Neck ROM: full  No difficulty expected  Dental - normal exam     Pulmonary    Cardiovascular         Neuro/Psych  GI/Hepatic/Renal/Endo      Musculoskeletal     Abdominal    Substance History      OB/GYN    (+) Pregnant,         Other                        Anesthesia Plan    ASA 2     epidural       Anesthetic plan, all risks, benefits, and alternatives have been provided, discussed and informed consent has been obtained with: patient.

## 2021-07-08 VITALS
DIASTOLIC BLOOD PRESSURE: 65 MMHG | BODY MASS INDEX: 32.07 KG/M2 | RESPIRATION RATE: 13 BRPM | OXYGEN SATURATION: 99 % | HEART RATE: 80 BPM | WEIGHT: 163.36 LBS | SYSTOLIC BLOOD PRESSURE: 102 MMHG | HEIGHT: 60 IN | TEMPERATURE: 98.8 F

## 2021-07-08 PROBLEM — Z34.90 PREGNANT: Status: RESOLVED | Noted: 2021-07-07 | Resolved: 2021-07-08

## 2021-07-08 LAB
BASOPHILS # BLD AUTO: 0 10*3/MM3 (ref 0–0.2)
BASOPHILS NFR BLD AUTO: 0.2 % (ref 0–1.5)
DEPRECATED RDW RBC AUTO: 49.4 FL (ref 37–54)
EOSINOPHIL # BLD AUTO: 0.1 10*3/MM3 (ref 0–0.4)
EOSINOPHIL NFR BLD AUTO: 0.4 % (ref 0.3–6.2)
ERYTHROCYTE [DISTWIDTH] IN BLOOD BY AUTOMATED COUNT: 15.2 % (ref 12.3–15.4)
HCT VFR BLD AUTO: 36.5 % (ref 34–46.6)
HGB BLD-MCNC: 12.2 G/DL (ref 12–15.9)
LYMPHOCYTES # BLD AUTO: 2.1 10*3/MM3 (ref 0.7–3.1)
LYMPHOCYTES NFR BLD AUTO: 14.4 % (ref 19.6–45.3)
MCH RBC QN AUTO: 32.3 PG (ref 26.6–33)
MCHC RBC AUTO-ENTMCNC: 33.5 G/DL (ref 31.5–35.7)
MCV RBC AUTO: 96.5 FL (ref 79–97)
MONOCYTES # BLD AUTO: 0.7 10*3/MM3 (ref 0.1–0.9)
MONOCYTES NFR BLD AUTO: 4.6 % (ref 5–12)
NEUTROPHILS NFR BLD AUTO: 11.6 10*3/MM3 (ref 1.7–7)
NEUTROPHILS NFR BLD AUTO: 80.4 % (ref 42.7–76)
NRBC BLD AUTO-RTO: 0 /100 WBC (ref 0–0.2)
PLATELET # BLD AUTO: 183 10*3/MM3 (ref 140–450)
PMV BLD AUTO: 9.8 FL (ref 6–12)
RBC # BLD AUTO: 3.78 10*6/MM3 (ref 3.77–5.28)
WBC # BLD AUTO: 14.4 10*3/MM3 (ref 3.4–10.8)

## 2021-07-08 PROCEDURE — 85025 COMPLETE CBC W/AUTO DIFF WBC: CPT | Performed by: OBSTETRICS & GYNECOLOGY

## 2021-07-08 RX ORDER — IBUPROFEN 600 MG/1
600 TABLET ORAL EVERY 6 HOURS PRN
Qty: 30 TABLET | Refills: 1 | Status: SHIPPED | OUTPATIENT
Start: 2021-07-08 | End: 2021-08-16

## 2021-07-08 RX ADMIN — PRENATAL VITAMINS-IRON FUMARATE 27 MG IRON-FOLIC ACID 0.8 MG TABLET 1 TABLET: at 08:26

## 2021-07-08 RX ADMIN — DOCUSATE SODIUM 100 MG: 100 CAPSULE, LIQUID FILLED ORAL at 08:26

## 2021-07-08 NOTE — PLAN OF CARE
Goal Outcome Evaluation:   Pt pain controlled. Pt's questions answered about post vaginal delivery. Pt feels comfortable with discharge.

## 2021-07-08 NOTE — LACTATION NOTE
This note was copied from a baby's chart.  Pt denies hx of breast surgery, no allergy to wool or foods, Medela gel patches provided, instructed on use.  She has a Motif pump at home. Bf her 11 yo x 2 weeks with difficulty. Takes pnv.  Assisted to position in lt cross cradle hold, baby nursing well. Reports a recent feeding for short time in each side.  Encouraged to feed on demand. Will call for help as needed.

## 2021-07-08 NOTE — ANESTHESIA POSTPROCEDURE EVALUATION
Patient: Karen Coronado    Procedure Summary     Date: 07/07/21 Room / Location:     Anesthesia Start: 1043 Anesthesia Stop: 1447    Procedure: LABOR ANALGESIA Diagnosis:     Scheduled Providers:  Provider: Shawn Prather MD    Anesthesia Type: epidural ASA Status: 2          Anesthesia Type: epidural    Vitals  Vitals Value Taken Time   /65 07/07/21 1900   Temp 98.7 °F (37.1 °C) 07/07/21 1900   Pulse 89 07/07/21 1900   Resp 17 07/07/21 1900   SpO2 97 % 07/07/21 1900           Post Anesthesia Care and Evaluation    Patient location during evaluation: PACU  Patient participation: complete - patient participated  Level of consciousness: awake  Pain scale: See nurse's notes for pain score.  Pain management: adequate  Airway patency: patent  Anesthetic complications: No anesthetic complications  PONV Status: none  Cardiovascular status: acceptable  Respiratory status: acceptable  Hydration status: acceptable  Post Neuraxial Block status: Motor and sensory function returned to baseline and No signs or symptoms of PDPH  Comments: Patient seen and examined postoperatively; vital signs stable; SpO2 greater than or equal to 90%; cardiopulmonary status stable; nausea/vomiting adequately controlled; pain adequately controlled; no apparent anesthesia complications; patient discharged from anesthesia care when discharge criteria were met.      Intrathecal catheter left in 8 hrs after delivery.  Pt seen and examined post partum.  Pt standing up right in room, no sign of PDPH.  Intrathecal catheter bolused with 5 mL sterile NS and d/c'd.  Pt and family counseled about symptoms of PDPH and treatment options.

## 2021-07-08 NOTE — DISCHARGE SUMMARY
[facility]  Delivery Discharge Summary    Primary OB Clinician: Sheila Motley MD    Admission Diagnosis:  Active Problems:    * No active hospital problems. *  40w3d IUP  Labor    Discharge Diagnosis:  Same, delivered    Gestational Age: 40w3d    Date of Delivery: 2021     Delivered By:  Sheila Motley     Delivery Type: Vaginal, Spontaneous      Tubal Ligation: desires at 6 wks pp    Intrapartum Course: Uncomplicated delivery.     Postpartum Course:  Pt was admitted and underwent  Spontaneous Vaginal Delivery. Pt was transferred to PP where she had an uncomplicated course. Pt remained AFVSS, had scant lochia and pain was well controlled. Pt d/c home in stable condition and will f/u in office for PP visit as scheduled or PRN. Currently breastfeeding. Plans on desires BTL 6wks postpartum  for contraception.     Physical Exam:    Vitals:   Vitals:    21 2300 21 0300 21 0838 21 1132   BP: 101/66 111/71 110/74 110/70   BP Location: Left arm Left arm Left arm Left arm   Patient Position: Lying Lying Lying Lying   Pulse: 84 82 74 85   Resp: 17 16 18 14   Temp: 98.3 °F (36.8 °C) 98 °F (36.7 °C) 98.5 °F (36.9 °C) 98.5 °F (36.9 °C)   TempSrc: Oral Oral Oral Oral   SpO2: 98% 97% 98% 97%   Weight:       Height:         Temp (24hrs), Av.3 °F (36.8 °C), Min:98 °F (36.7 °C), Max:98.7 °F (37.1 °C)      General Appearance:    Alert, cooperative, in no acute distress   Abdomen:     Soft non-tender, non-distended, no guarding, no rebound         tenderness.   Extremities:   Moves all extremities well, no edema, no cyanosis, no              Redness.   Incision:   n/a   Fundus:   Firm, below umbilicus     Feeding method: Breastfeeding Status: Yes    Labs:  Results from last 7 days   Lab Units 21  1015 21  1028   WBC 10*3/mm3 14.40* 13.80*   HEMOGLOBIN g/dL 12.2 12.9   HEMATOCRIT % 36.5 38.5   PLATELETS 10*3/mm3 183 186           Blood Type: RH Positive      Plan:  Discharge to home.     Follow-up appointment with Dr Motley in 6 weeks.    Liyah Moore, APRN  7/8/2021  12:15 EDT      /d

## 2021-07-09 NOTE — SIGNIFICANT NOTE
Case Management Discharge Note           Provided Post Acute Provider List?: N/A  Provided Post Acute Provider Quality & Resource List?: N/A    Selected Continued Care - Discharged on 7/8/2021 Admission date: 7/7/2021 - Discharge disposition: Home or Self Care                   Final Discharge Disposition Code: (P) 01 - home or self-care

## 2021-07-11 ENCOUNTER — ANESTHESIA (OUTPATIENT)
Dept: EMERGENCY DEPT | Facility: HOSPITAL | Age: 26
End: 2021-07-11

## 2021-07-11 ENCOUNTER — ANESTHESIA EVENT (OUTPATIENT)
Dept: EMERGENCY DEPT | Facility: HOSPITAL | Age: 26
End: 2021-07-11

## 2021-07-11 ENCOUNTER — HOSPITAL ENCOUNTER (EMERGENCY)
Facility: HOSPITAL | Age: 26
Discharge: HOME | End: 2021-07-11
Attending: EMERGENCY MEDICINE

## 2021-07-11 VITALS
WEIGHT: 152.56 LBS | TEMPERATURE: 98.3 F | BODY MASS INDEX: 29.95 KG/M2 | DIASTOLIC BLOOD PRESSURE: 65 MMHG | SYSTOLIC BLOOD PRESSURE: 110 MMHG | OXYGEN SATURATION: 98 % | HEART RATE: 84 BPM | RESPIRATION RATE: 16 BRPM | HEIGHT: 60 IN

## 2021-07-11 LAB
BASOPHILS # BLD AUTO: 0.1 10*3/MM3 (ref 0–0.2)
BASOPHILS NFR BLD AUTO: 0.6 % (ref 0–1.5)
DEPRECATED RDW RBC AUTO: 49.9 FL (ref 37–54)
EOSINOPHIL # BLD AUTO: 0.1 10*3/MM3 (ref 0–0.4)
EOSINOPHIL NFR BLD AUTO: 0.6 % (ref 0.3–6.2)
ERYTHROCYTE [DISTWIDTH] IN BLOOD BY AUTOMATED COUNT: 15.4 % (ref 12.3–15.4)
HCT VFR BLD AUTO: 37.9 % (ref 34–46.6)
HGB BLD-MCNC: 12.9 G/DL (ref 12–15.9)
LYMPHOCYTES # BLD AUTO: 1.5 10*3/MM3 (ref 0.7–3.1)
LYMPHOCYTES NFR BLD AUTO: 12.3 % (ref 19.6–45.3)
MCH RBC QN AUTO: 32.7 PG (ref 26.6–33)
MCHC RBC AUTO-ENTMCNC: 34.1 G/DL (ref 31.5–35.7)
MCV RBC AUTO: 96 FL (ref 79–97)
MONOCYTES # BLD AUTO: 0.4 10*3/MM3 (ref 0.1–0.9)
MONOCYTES NFR BLD AUTO: 3.1 % (ref 5–12)
NEUTROPHILS NFR BLD AUTO: 10.4 10*3/MM3 (ref 1.7–7)
NEUTROPHILS NFR BLD AUTO: 83.4 % (ref 42.7–76)
NRBC BLD AUTO-RTO: 0 /100 WBC (ref 0–0.2)
PLATELET # BLD AUTO: 221 10*3/MM3 (ref 140–450)
PMV BLD AUTO: 9.2 FL (ref 6–12)
RBC # BLD AUTO: 3.94 10*6/MM3 (ref 3.77–5.28)
WBC # BLD AUTO: 12.5 10*3/MM3 (ref 3.4–10.8)

## 2021-07-11 PROCEDURE — 25010000002 DEXAMETHASONE PER 1 MG: Performed by: EMERGENCY MEDICINE

## 2021-07-11 PROCEDURE — 85025 COMPLETE CBC W/AUTO DIFF WBC: CPT | Performed by: EMERGENCY MEDICINE

## 2021-07-11 PROCEDURE — 96375 TX/PRO/DX INJ NEW DRUG ADDON: CPT

## 2021-07-11 PROCEDURE — 25010000002 ONDANSETRON PER 1 MG: Performed by: EMERGENCY MEDICINE

## 2021-07-11 PROCEDURE — 96374 THER/PROPH/DIAG INJ IV PUSH: CPT

## 2021-07-11 PROCEDURE — 99283 EMERGENCY DEPT VISIT LOW MDM: CPT

## 2021-07-11 PROCEDURE — 25010000002 HYDROMORPHONE PER 4 MG: Performed by: EMERGENCY MEDICINE

## 2021-07-11 RX ORDER — ONDANSETRON 2 MG/ML
4 INJECTION INTRAMUSCULAR; INTRAVENOUS ONCE
Status: COMPLETED | OUTPATIENT
Start: 2021-07-11 | End: 2021-07-11

## 2021-07-11 RX ORDER — HYDROMORPHONE HCL 110MG/55ML
0.5 PATIENT CONTROLLED ANALGESIA SYRINGE INTRAVENOUS ONCE
Status: COMPLETED | OUTPATIENT
Start: 2021-07-11 | End: 2021-07-11

## 2021-07-11 RX ORDER — DEXAMETHASONE SODIUM PHOSPHATE 4 MG/ML
4 INJECTION, SOLUTION INTRA-ARTICULAR; INTRALESIONAL; INTRAMUSCULAR; INTRAVENOUS; SOFT TISSUE ONCE
Status: COMPLETED | OUTPATIENT
Start: 2021-07-11 | End: 2021-07-11

## 2021-07-11 RX ADMIN — ONDANSETRON 4 MG: 2 INJECTION INTRAMUSCULAR; INTRAVENOUS at 13:34

## 2021-07-11 RX ADMIN — DEXAMETHASONE SODIUM PHOSPHATE 4 MG: 4 INJECTION, SOLUTION INTRAMUSCULAR; INTRAVENOUS at 13:33

## 2021-07-11 RX ADMIN — SODIUM CHLORIDE 1000 ML: 9 INJECTION, SOLUTION INTRAVENOUS at 13:29

## 2021-07-11 RX ADMIN — HYDROMORPHONE HYDROCHLORIDE 0.5 MG: 2 INJECTION, SOLUTION INTRAMUSCULAR; INTRAVENOUS; SUBCUTANEOUS at 13:34

## 2021-07-11 NOTE — ANESTHESIA PREPROCEDURE EVALUATION
Anesthesia Evaluation     Patient summary reviewed and Nursing notes reviewed   no history of anesthetic complications:  NPO Solid Status: > 8 hours  NPO Liquid Status: > 2 hours           Airway   Mallampati: I  TM distance: >3 FB  Neck ROM: full  No difficulty expected  Dental - normal exam     Pulmonary - negative pulmonary ROS and normal exam   Cardiovascular - negative cardio ROS and normal exam        Neuro/Psych- negative ROS  GI/Hepatic/Renal/Endo - negative ROS     Musculoskeletal (-) negative ROS    Abdominal  - normal exam   Substance History - negative use     OB/GYN      Comment:  19 WITH EPIDURAL  + DURAL PUNCTURE NOTED AT THAT TIME  NOW WITH POSTURAL HEADACHE X4 DAYS    WILL DO BLOOD PATCH      Other - negative ROS                       Anesthesia Plan    ASA 2     epidural       Anesthetic plan, all risks, benefits, and alternatives have been provided, discussed and informed consent has been obtained with: patient.

## 2021-07-11 NOTE — ED PROVIDER NOTES
Subjective   26-year-old P2 had the onset of headache after delivery on the seventh.  The patient had an epidural.  She states she was told at that time that she might have headaches after the epidural.  She states she has had no fever or chills.  She reports no leg pain or swelling.  She states that the headache increases with neck flexion or activity.  She reports she has had some intermittent nausea.  She has no history of migraines.  She reports no focal neurologic defects or lateralizing neurologic signs.  She states that she has had some vague soreness in her midline lower back          Review of Systems   HENT: Negative for trouble swallowing.    Respiratory: Negative for shortness of breath.    Cardiovascular: Negative for chest pain and palpitations.   Gastrointestinal: Negative for diarrhea and vomiting.   Genitourinary: Positive for vaginal bleeding. Negative for decreased urine volume and vaginal discharge.   Musculoskeletal: Positive for back pain and neck pain. Negative for neck stiffness.   Neurological: Negative for syncope.   Hematological: Does not bruise/bleed easily.   All other systems reviewed and are negative.      History reviewed. No pertinent past medical history.    No Known Allergies    History reviewed. No pertinent surgical history.    History reviewed. No pertinent family history.    Social History     Socioeconomic History   • Marital status:      Spouse name: Xavier   • Number of children: 1   • Years of education: Not on file   • Highest education level: Not on file   Tobacco Use   • Smoking status: Never Smoker   • Smokeless tobacco: Never Used   Substance and Sexual Activity   • Alcohol use: Defer   • Drug use: Never   • Sexual activity: Yes     Partners: Male     States that she is attempting to breast-feed but his only having colostrum so far      Objective   Physical Exam  Alert Helen Coma Scale 15   HEENT: Pupils equal and reactive to light. Conjunctivae are not  injected. normal tympanic membranes. Oropharynx and nares are normal.   Neck: Supple. Midline trachea. No JVD. No goiter.   Chest: Clear and equal breath sounds bilaterally regular rate and rhythm without murmur or rub.   Abdomen: Positive bowel sounds nontender nondistended. No rebound or peritoneal signs. No CVA tenderness.  Puncture wound in the lumbar area shows no evidence of cellulitis.  Extremities no clubbing cyanosis or edema motor sensory exam is normal the full range of motion is intact negative Kernig or Brudzinski   skin: Warm and dry, no rashes or petechia.   Lymphatic: No regional lymphadenopathy. No calf pain, swelling or Hank's sign    Procedures       Patch per anesthesia    ED Course                                   Labs Reviewed   CBC WITH AUTO DIFFERENTIAL - Abnormal; Notable for the following components:       Result Value    WBC 12.50 (*)     Neutrophil % 83.4 (*)     Lymphocyte % 12.3 (*)     Monocyte % 3.1 (*)     Neutrophils, Absolute 10.40 (*)     All other components within normal limits   CBC AND DIFFERENTIAL    Narrative:     The following orders were created for panel order CBC & Differential.  Procedure                               Abnormality         Status                     ---------                               -----------         ------                     CBC Auto Differential[[id number]]        Abnormal            Final result                 Please view results for these tests on the individual orders.     Medications   sodium chloride 0.9 % bolus 1,000 mL (1,000 mL Intravenous New Bag 7/11/21 1329)   HYDROmorphone (DILAUDID) injection 0.5 mg (0.5 mg Intravenous Given 7/11/21 1334)   ondansetron (ZOFRAN) injection 4 mg (4 mg Intravenous Given 7/11/21 1334)   dexamethasone (DECADRON) injection 4 mg (4 mg Intravenous Given 7/11/21 1333)             MDM  Number of Diagnoses or Management Options     Amount and/or Complexity of Data Reviewed  Clinical lab tests: reviewed and  ordered  Review and summarize past medical records: yes  Discuss the patient with other providers: yes    Risk of Complications, Morbidity, and/or Mortality  Presenting problems: high  Diagnostic procedures: high  Management options: high  General comments: The case was discussed with on-call anesthesia who graciously presented for blood patch.  The patient states she felt better with ER and anesthetic care.  The patient will be discharge she is encouraged to follow-up with a primary care provider the patient was stable at discharge and vocalized understanding of discharge instructions and warnings        Final diagnoses:   Postpartum spinal headache       ED Disposition  ED Disposition     ED Disposition Condition Comment    Discharge Stable           Sheila Motley MD  [address] [place] [address] [place] [id number]  [phone]               Medication List      No changes were made to your prescriptions during this visit.          Denis Dodge MD  07/11/21 [id number]

## 2021-07-11 NOTE — DISCHARGE INSTRUCTIONS
Rest, avoid exertion next 2 days  Plenty of fluids  Tylenol or ibuprofen for discomfort  Follow-up with OB/GYN or return for worsening symptoms

## 2021-07-11 NOTE — ED NOTES
Pt here c/o headache.  Pt had epidural during labor on 7/7/21 and has had a headache since.       Annalisa Munguia, LPN  07/11/21 [id number]

## 2021-07-27 ENCOUNTER — LAB REQUISITION (OUTPATIENT)
Dept: LAB | Facility: HOSPITAL | Age: 26
End: 2021-07-27

## 2021-07-27 DIAGNOSIS — O91.119: ICD-10-CM

## 2021-07-27 DIAGNOSIS — Z00.00 ENCOUNTER FOR GENERAL ADULT MEDICAL EXAMINATION WITHOUT ABNORMAL FINDINGS: ICD-10-CM

## 2021-07-27 PROCEDURE — 87205 SMEAR GRAM STAIN: CPT | Performed by: RADIOLOGY

## 2021-07-27 PROCEDURE — 87186 SC STD MICRODIL/AGAR DIL: CPT | Performed by: RADIOLOGY

## 2021-07-27 PROCEDURE — 87015 SPECIMEN INFECT AGNT CONCNTJ: CPT | Performed by: RADIOLOGY

## 2021-07-27 PROCEDURE — 88108 CYTOPATH CONCENTRATE TECH: CPT | Performed by: RADIOLOGY

## 2021-07-27 PROCEDURE — 87070 CULTURE OTHR SPECIMN AEROBIC: CPT | Performed by: RADIOLOGY

## 2021-07-27 PROCEDURE — 87077 CULTURE AEROBIC IDENTIFY: CPT | Performed by: RADIOLOGY

## 2021-07-28 ENCOUNTER — OFFICE VISIT (OUTPATIENT)
Dept: SURGERY | Facility: CLINIC | Age: 26
End: 2021-07-28

## 2021-07-28 VITALS
HEART RATE: 56 BPM | RESPIRATION RATE: 16 BRPM | HEIGHT: 60 IN | TEMPERATURE: 98.2 F | DIASTOLIC BLOOD PRESSURE: 76 MMHG | OXYGEN SATURATION: 100 % | SYSTOLIC BLOOD PRESSURE: 114 MMHG | BODY MASS INDEX: 27.48 KG/M2 | WEIGHT: 140 LBS

## 2021-07-28 DIAGNOSIS — N61.1 ABSCESS OF BREAST, RIGHT: Primary | ICD-10-CM

## 2021-07-28 PROCEDURE — 87147 CULTURE TYPE IMMUNOLOGIC: CPT | Performed by: SURGERY

## 2021-07-28 PROCEDURE — 87205 SMEAR GRAM STAIN: CPT | Performed by: SURGERY

## 2021-07-28 PROCEDURE — 87070 CULTURE OTHR SPECIMN AEROBIC: CPT | Performed by: SURGERY

## 2021-07-28 PROCEDURE — 99204 OFFICE O/P NEW MOD 45 MIN: CPT | Performed by: SURGERY

## 2021-07-28 PROCEDURE — 10060 I&D ABSCESS SIMPLE/SINGLE: CPT | Performed by: SURGERY

## 2021-07-28 RX ORDER — CEPHALEXIN 500 MG/1
CAPSULE ORAL
COMMUNITY
Start: 2021-07-26 | End: 2021-08-16

## 2021-07-28 RX ORDER — SULFAMETHOXAZOLE AND TRIMETHOPRIM 800; 160 MG/1; MG/1
1 TABLET ORAL 2 TIMES DAILY
Qty: 20 TABLET | Refills: 0 | Status: SHIPPED | OUTPATIENT
Start: 2021-07-28 | End: 2021-08-16

## 2021-07-28 NOTE — PROGRESS NOTES
GENERAL SURGERY CONSULTATION NOTE    Consult requested by: MD Tolu    Patient Care Team:  Sheila Motley MD as PCP - General (Obstetrics and Gynecology)    Reason for consult: right breast abscess    Subjective     Patient is a 26 y.o. female presents with a right breast abscess and mastitis which started about 10 days ago.  Patient states that she had her second child on 7/7/2021 and was breast-feeding until a few days prior to the onset of symptoms.  After that, she was unable to get any breast milk to expressed from the right breast which then became swollen, red, and tender.  She denies fevers.  She went to see an urgent care center who gave her a single dose of Rocephin for mastitis and Augmentin.  After this, the patient followed up with her gynecologist.  There was a palpable lump which corresponded to a 5.8 x 2.8 x 4.7 cm complex avascular fluid collection concerning for abscess or infected galactocele.  This was amenable to ultrasound-guided aspiration, and the patient reportedly had a ultrasound-guided aspiration of this area on 7/26/2021.  Approximately 20 mL of milky appearing fluid was able to be drained, however the patient still had significant swelling, pain, and tenderness.  She is now off Augmentin, and taking Keflex, but reports that this has not made much of a difference.  She is attempting to pump that side and apply warm compresses as well..     Review of Systems   Constitutional: Negative for appetite change, chills and fever.   HENT: Negative for congestion and sore throat.    Respiratory: Negative for cough and shortness of breath.    Cardiovascular: Negative for chest pain and palpitations.   Gastrointestinal: Negative for abdominal pain, constipation, diarrhea, nausea, vomiting and GERD.   Genitourinary: Positive for breast discharge, breast lump and breast pain. Negative for difficulty urinating, dysuria and frequency.   Musculoskeletal: Negative for arthralgias and back pain.    Skin: Positive for color change and rash. Negative for skin lesions.   Neurological: Negative for dizziness, seizures and memory problem.   Hematological: Negative for adenopathy. Does not bruise/bleed easily.   Psychiatric/Behavioral: Negative for sleep disturbance and depressed mood.        History  History reviewed. No pertinent past medical history.  History reviewed. No pertinent surgical history.  Family History   Problem Relation Age of Onset   • No Known Problems Father      Social History     Tobacco Use   • Smoking status: Never Smoker   • Smokeless tobacco: Never Used   Vaping Use   • Vaping Use: Never used   Substance Use Topics   • Alcohol use: Defer   • Drug use: Never     (Not in a hospital admission)    Allergies:  Patient has no known allergies.    Objective     Vital Signs  Temp:  [98.2 °F (36.8 °C)] 98.2 °F (36.8 °C)  Heart Rate:  [56] 56  Resp:  [16] 16  BP: (114)/(76) 114/76    Physical Exam  Vitals reviewed. Exam conducted with a chaperone present.   Constitutional:       Appearance: She is well-developed.   HENT:      Head: Normocephalic and atraumatic.   Eyes:      Pupils: Pupils are equal, round, and reactive to light.   Cardiovascular:      Rate and Rhythm: Normal rate and regular rhythm.   Pulmonary:      Effort: Pulmonary effort is normal.      Breath sounds: Normal breath sounds.   Chest:      Breasts:         Right: Inverted nipple, mass, skin change and tenderness present. No nipple discharge.        Comments: The right breast is markedly inflamed with erythema, seen the majority of the lower outer quadrant of the right breast.  There is skin thickening, and masslike induration of the breast tissue of the lower outer quadrant.  Abdominal:      General: There is no distension.      Palpations: Abdomen is soft.      Tenderness: There is no abdominal tenderness.      Hernia: No hernia is present.   Musculoskeletal:         General: Normal range of motion.      Cervical back: Normal  range of motion.   Lymphadenopathy:      Cervical: No cervical adenopathy.      Upper Body:      Right upper body: No supraclavicular or axillary adenopathy.   Skin:     General: Skin is warm and dry.      Findings: No rash.   Neurological:      Mental Status: She is alert and oriented to person, place, and time.         Results Review:   Lab Results (last 24 hours)     ** No results found for the last 24 hours. **        No radiology results for the last day      I reviewed the patient's new imaging results and agree with the interpretation.  I reviewed the patient's other test results and agree with the interpretation    Assessment/Plan     Active Problems:  Right breast mastitis from breast-feeding  Right breast abscess    Discussed with patient that most of the time aspiration is sufficient, however given the fluctuant mass which is reaccumulated in just 2 days, I think it would be prudent to repeat an aspiration versus incision and drainage of the right breast.  She is on Keflex right now, and review of her cultures which were obtained demonstrate that this is staph aureus which is resistant to clindamycin and erythromycin, but susceptible to Bactrim.  For description of procedure please see attached note  We are able to get copious amounts of purulent drainage out of the right breast.  This led to substantial resolution of her symptoms, however there is still a large amount of induration present.  Recommend continued warm compresses, aggressive pumping, and Bactrim for 10 days.  Patient will follow up with me in 2 weeks  Encouraged to wear sports bra for the next few days    I discussed the patients findings and my recommendations with the patient.     Klaus Neves MD  07/28/21  09:35 EDT

## 2021-07-28 NOTE — PROGRESS NOTES
Operative Note    Patient Name:  Karen Coronado  YOB: 1995    Date of Surgery:  7/28/2021      Indications: Patient is a 26-year-old lady with right breast mastitis and a right breast abscess from breast-feeding.    Pre-op Diagnosis:   Right breast abscess    Post-op Diagnosis:  Post-Op Diagnosis Codes:  Right breast abscess    Procedure/CPT® Codes:  Aspiration of right breast abscess followed by incision and drainage of right breast abscess        Staff:  MD Pura-surgeon    Anesthesia: 1% lidocaine    Estimated Blood Loss: 5 mL    Implants:    None    Specimen:          A: Right breast purulent fluid    Findings: 50 mL of purulent fluid with thick, cheesy matter expressed as well    Complications: None, immediately    Description of Procedure: After obtaining informed consent, the patient was positioned in the supine position in the procedure room.  The right breast was then prepped and draped in the usual sterile fashion.  I began by injecting local anesthesia in the periareolar area just outside of the nipple areolar complex.  I then used an 18-gauge needle and was able to express approximately 15 mL of milky appearing fluid.  This was then placed in a specimen container.  I attempted to aspirate more of the fluid, but was unsuccessful.  It was apparent that there was some cheesy, fibrinous material which was clogging the needle.  It was at this point that I offered the patient an incision and drainage which she obliged.  I then used an 11 blade to incise along the nipple areolar complex where I had infiltrated my local anesthesia.  Immediately there was a rush of purulent fluid and cheesy matter which was able to be expressed through the 1 cm opening.  Using gentle pressure, I was able to express a significant amount of fluid and cheesy appearing material.  The patient then noted resolution of her symptoms, as the pressure had greatly decreased.  We then cleaned the wound, and dressed  it with a 4 x 4 gauze and a Band-Aid.  The patient tolerated the procedure well, was able to leave the room under her own power.    Klaus Neves MD     Date: 7/28/2021  Time: 09:41 EDT

## 2021-07-29 LAB
LAB AP CASE REPORT: NORMAL
LAB AP DIAGNOSIS COMMENT: NORMAL
PATH REPORT.FINAL DX SPEC: NORMAL
PATH REPORT.GROSS SPEC: NORMAL

## 2021-07-30 LAB
BACTERIA FLD CULT: ABNORMAL
GRAM STN SPEC: ABNORMAL

## 2021-08-16 ENCOUNTER — OFFICE VISIT (OUTPATIENT)
Dept: SURGERY | Facility: CLINIC | Age: 26
End: 2021-08-16

## 2021-08-16 VITALS
BODY MASS INDEX: 27.68 KG/M2 | OXYGEN SATURATION: 100 % | TEMPERATURE: 97.8 F | HEIGHT: 60 IN | SYSTOLIC BLOOD PRESSURE: 120 MMHG | HEART RATE: 59 BPM | DIASTOLIC BLOOD PRESSURE: 80 MMHG | WEIGHT: 141 LBS

## 2021-08-16 DIAGNOSIS — N61.1 ABSCESS OF BREAST, RIGHT: Primary | ICD-10-CM

## 2021-08-16 PROCEDURE — 99213 OFFICE O/P EST LOW 20 MIN: CPT | Performed by: SURGERY

## 2021-08-17 NOTE — PROGRESS NOTES
GENERAL SURGERY CONSULTATION NOTE    Consult requested by: MD Tolu    Patient Care Team:  Sheila Motley MD as PCP - General (Obstetrics and Gynecology)    Reason for consult: right breast abscess    Subjective   From 7/28/2021:  Patient is a 26 y.o. female presents with a right breast abscess and mastitis which started about 10 days ago.  Patient states that she had her second child on 7/7/2021 and was breast-feeding until a few days prior to the onset of symptoms.  After that, she was unable to get any breast milk to expressed from the right breast which then became swollen, red, and tender.  She denies fevers.  She went to see an urgent care center who gave her a single dose of Rocephin for mastitis and Augmentin.  After this, the patient followed up with her gynecologist.  There was a palpable lump which corresponded to a 5.8 x 2.8 x 4.7 cm complex avascular fluid collection concerning for abscess or infected galactocele.  This was amenable to ultrasound-guided aspiration, and the patient reportedly had a ultrasound-guided aspiration of this area on 7/26/2021.  Approximately 20 mL of milky appearing fluid was able to be drained, however the patient still had significant swelling, pain, and tenderness.  She is now off Augmentin, and taking Keflex, but reports that this has not made much of a difference.  She is attempting to pump that side and apply warm compresses as well    From 8/16/2021:   Patient returns to clinic today after an I&D of the right breast abscess.  She reports that the amount of redness surrounding her breast has decreased significantly.  She is no longer having fevers and is off antibiotics.  She is still somewhat tender around the incision, but the amount of discomfort which she has had has greatly reduced.  She is still pumping somewhat on the left side, but has stopped pumping her right breast altogether.    Review of Systems   Constitutional: Negative for appetite change, chills  and fever.   HENT: Negative for congestion and sore throat.    Respiratory: Negative for cough and shortness of breath.    Cardiovascular: Negative for chest pain and palpitations.   Gastrointestinal: Negative for abdominal pain, constipation, diarrhea, nausea, vomiting and GERD.   Genitourinary: Positive for breast lump. Negative for breast discharge, breast pain, difficulty urinating, dysuria and frequency.   Musculoskeletal: Negative for arthralgias and back pain.   Skin: Positive for color change and rash. Negative for skin lesions.   Neurological: Negative for dizziness, seizures and memory problem.   Hematological: Negative for adenopathy. Does not bruise/bleed easily.   Psychiatric/Behavioral: Negative for sleep disturbance and depressed mood.        History  No past medical history on file.  No past surgical history on file.  Family History   Problem Relation Age of Onset   • No Known Problems Father      Social History     Tobacco Use   • Smoking status: Never Smoker   • Smokeless tobacco: Never Used   Vaping Use   • Vaping Use: Never used   Substance Use Topics   • Alcohol use: Defer   • Drug use: Never     (Not in a hospital admission)    Allergies:  Patient has no known allergies.    Objective     Vital Signs  Temp:  [97.8 °F (36.6 °C)] 97.8 °F (36.6 °C)  Heart Rate:  [59] 59  BP: (120)/(80) 120/80    Physical Exam  Vitals reviewed. Exam conducted with a chaperone present.   Constitutional:       Appearance: She is well-developed.   HENT:      Head: Normocephalic and atraumatic.   Eyes:      Pupils: Pupils are equal, round, and reactive to light.   Cardiovascular:      Rate and Rhythm: Normal rate and regular rhythm.   Pulmonary:      Effort: Pulmonary effort is normal.      Breath sounds: Normal breath sounds.   Chest:      Breasts:         Right: Mass and tenderness present. No inverted nipple, nipple discharge or skin change.        Comments: The overall amount of erythema surrounding the right breast  is reduced dramatically.  There is only some slight erythema surrounding the lateral aspect of the nipple areolar complex.  There are no fluctuant fluid collections which can be appreciated on physical exam today, but there is a significant amount of firm indurated breast tissue likely related to residual mastitis.  Abdominal:      General: There is no distension.      Palpations: Abdomen is soft.      Tenderness: There is no abdominal tenderness.      Hernia: No hernia is present.   Musculoskeletal:         General: Normal range of motion.      Cervical back: Normal range of motion.   Lymphadenopathy:      Cervical: No cervical adenopathy.      Upper Body:      Right upper body: No supraclavicular or axillary adenopathy.   Skin:     General: Skin is warm and dry.      Findings: No rash.   Neurological:      Mental Status: She is alert and oriented to person, place, and time.         Results Review:   Lab Results (last 24 hours)     ** No results found for the last 24 hours. **        No radiology results for the last day      I reviewed the patient's new imaging results and agree with the interpretation.  I reviewed the patient's other test results and agree with the interpretation    Assessment/Plan     Active Problems:  Right breast mastitis from breast-feeding  Right breast abscess    Recommend continued warm compresses to the breast to help with the residual mastitis.  Okay to stop pumping  Recommend follow-up right breast ultrasound in 3 months to ensure complete resolution of mastitis and fluid collections.  Follow-up with me after that  If there is no evidence of malignancy, patient will likely be able to resume routine screening mammography at that time as this breast abscess was related to breast-feeding.  Should the patient develop worsening erythema, pain, or swelling in the future, I instructed her to follow-up with me sooner rather than later.    I discussed the patients findings and my recommendations  with the patient.     Klaus Neves MD  08/17/21  10:06 EDT

## 2021-10-18 ENCOUNTER — LAB (OUTPATIENT)
Dept: LAB | Facility: HOSPITAL | Age: 26
End: 2021-10-18

## 2021-10-18 ENCOUNTER — TRANSCRIBE ORDERS (OUTPATIENT)
Dept: LAB | Facility: HOSPITAL | Age: 26
End: 2021-10-18

## 2021-10-18 DIAGNOSIS — Z01.818 PREOPERATIVE TESTING: ICD-10-CM

## 2021-10-18 DIAGNOSIS — Z01.818 PREOPERATIVE TESTING: Primary | ICD-10-CM

## 2021-10-18 LAB
ABO GROUP BLD: NORMAL
BASOPHILS # BLD AUTO: 0.03 10*3/MM3 (ref 0–0.2)
BASOPHILS NFR BLD AUTO: 0.5 % (ref 0–1.5)
BLD GP AB SCN SERPL QL: NEGATIVE
DEPRECATED RDW RBC AUTO: 44.6 FL (ref 37–54)
EOSINOPHIL # BLD AUTO: 0.3 10*3/MM3 (ref 0–0.4)
EOSINOPHIL NFR BLD AUTO: 4.8 % (ref 0.3–6.2)
ERYTHROCYTE [DISTWIDTH] IN BLOOD BY AUTOMATED COUNT: 12.7 % (ref 12.3–15.4)
HCT VFR BLD AUTO: 41.5 % (ref 34–46.6)
HGB BLD-MCNC: 13.2 G/DL (ref 12–15.9)
IMM GRANULOCYTES # BLD AUTO: 0.02 10*3/MM3 (ref 0–0.05)
IMM GRANULOCYTES NFR BLD AUTO: 0.3 % (ref 0–0.5)
LYMPHOCYTES # BLD AUTO: 1.85 10*3/MM3 (ref 0.7–3.1)
LYMPHOCYTES NFR BLD AUTO: 29.7 % (ref 19.6–45.3)
MCH RBC QN AUTO: 30.1 PG (ref 26.6–33)
MCHC RBC AUTO-ENTMCNC: 31.8 G/DL (ref 31.5–35.7)
MCV RBC AUTO: 94.7 FL (ref 79–97)
MONOCYTES # BLD AUTO: 0.41 10*3/MM3 (ref 0.1–0.9)
MONOCYTES NFR BLD AUTO: 6.6 % (ref 5–12)
NEUTROPHILS NFR BLD AUTO: 3.61 10*3/MM3 (ref 1.7–7)
NEUTROPHILS NFR BLD AUTO: 58.1 % (ref 42.7–76)
NRBC BLD AUTO-RTO: 0 /100 WBC (ref 0–0.2)
PLATELET # BLD AUTO: 244 10*3/MM3 (ref 140–450)
PMV BLD AUTO: 11.8 FL (ref 6–12)
RBC # BLD AUTO: 4.38 10*6/MM3 (ref 3.77–5.28)
RH BLD: POSITIVE
SARS-COV-2 ORF1AB RESP QL NAA+PROBE: NOT DETECTED
T&S EXPIRATION DATE: NORMAL
WBC # BLD AUTO: 6.22 10*3/MM3 (ref 3.4–10.8)

## 2021-10-18 PROCEDURE — 86900 BLOOD TYPING SEROLOGIC ABO: CPT

## 2021-10-18 PROCEDURE — U0004 COV-19 TEST NON-CDC HGH THRU: HCPCS

## 2021-10-18 PROCEDURE — 36415 COLL VENOUS BLD VENIPUNCTURE: CPT

## 2021-10-18 PROCEDURE — 86901 BLOOD TYPING SEROLOGIC RH(D): CPT

## 2021-10-18 PROCEDURE — 85025 COMPLETE CBC W/AUTO DIFF WBC: CPT

## 2021-10-18 PROCEDURE — C9803 HOPD COVID-19 SPEC COLLECT: HCPCS

## 2021-10-18 PROCEDURE — 86850 RBC ANTIBODY SCREEN: CPT

## 2022-12-29 ENCOUNTER — HOSPITAL ENCOUNTER (EMERGENCY)
Facility: HOSPITAL | Age: 27
Discharge: HOME | End: 2022-12-29
Attending: EMERGENCY MEDICINE | Admitting: EMERGENCY MEDICINE
Payer: COMMERCIAL

## 2022-12-29 ENCOUNTER — APPOINTMENT (OUTPATIENT)
Dept: CT IMAGING | Facility: HOSPITAL | Age: 27
End: 2022-12-29
Payer: COMMERCIAL

## 2022-12-29 ENCOUNTER — APPOINTMENT (OUTPATIENT)
Dept: GENERAL RADIOLOGY | Facility: HOSPITAL | Age: 27
End: 2022-12-29
Payer: COMMERCIAL

## 2022-12-29 VITALS
SYSTOLIC BLOOD PRESSURE: 110 MMHG | BODY MASS INDEX: 24.93 KG/M2 | DIASTOLIC BLOOD PRESSURE: 71 MMHG | TEMPERATURE: 98.3 F | OXYGEN SATURATION: 100 % | HEART RATE: 81 BPM | WEIGHT: 126.98 LBS | RESPIRATION RATE: 18 BRPM | HEIGHT: 60 IN

## 2022-12-29 DIAGNOSIS — R20.2 PARESTHESIA OF LEFT ARM AND LEG: Primary | ICD-10-CM

## 2022-12-29 DIAGNOSIS — R20.0 NUMBNESS OF LIP: ICD-10-CM

## 2022-12-29 LAB
ALBUMIN SERPL-MCNC: 4.5 G/DL (ref 3.5–5.2)
ALBUMIN/GLOB SERPL: 1.7 G/DL
ALP SERPL-CCNC: 67 U/L (ref 39–117)
ALT SERPL W P-5'-P-CCNC: 15 U/L (ref 1–33)
ANION GAP SERPL CALCULATED.3IONS-SCNC: 10 MMOL/L (ref 5–15)
AST SERPL-CCNC: 19 U/L (ref 1–32)
BASOPHILS # BLD AUTO: 0.1 10*3/MM3 (ref 0–0.2)
BASOPHILS NFR BLD AUTO: 1.2 % (ref 0–1.5)
BILIRUB SERPL-MCNC: 0.5 MG/DL (ref 0–1.2)
BUN SERPL-MCNC: 11 MG/DL (ref 6–20)
BUN/CREAT SERPL: 15.1 (ref 7–25)
CALCIUM SPEC-SCNC: 9 MG/DL (ref 8.6–10.5)
CHLORIDE SERPL-SCNC: 106 MMOL/L (ref 98–107)
CO2 SERPL-SCNC: 23 MMOL/L (ref 22–29)
CREAT SERPL-MCNC: 0.73 MG/DL (ref 0.57–1)
DEPRECATED RDW RBC AUTO: 45.5 FL (ref 37–54)
EGFRCR SERPLBLD CKD-EPI 2021: 114.3 ML/MIN/1.73
EOSINOPHIL # BLD AUTO: 0.2 10*3/MM3 (ref 0–0.4)
EOSINOPHIL NFR BLD AUTO: 2.1 % (ref 0.3–6.2)
ERYTHROCYTE [DISTWIDTH] IN BLOOD BY AUTOMATED COUNT: 14.2 % (ref 12.3–15.4)
GLOBULIN UR ELPH-MCNC: 2.6 GM/DL
GLUCOSE SERPL-MCNC: 108 MG/DL (ref 65–99)
HCT VFR BLD AUTO: 37.7 % (ref 34–46.6)
HGB BLD-MCNC: 12.7 G/DL (ref 12–15.9)
INR PPP: 1 (ref 0.93–1.1)
LYMPHOCYTES # BLD AUTO: 2.1 10*3/MM3 (ref 0.7–3.1)
LYMPHOCYTES NFR BLD AUTO: 22.5 % (ref 19.6–45.3)
MCH RBC QN AUTO: 29.7 PG (ref 26.6–33)
MCHC RBC AUTO-ENTMCNC: 33.6 G/DL (ref 31.5–35.7)
MCV RBC AUTO: 88.3 FL (ref 79–97)
MONOCYTES # BLD AUTO: 0.4 10*3/MM3 (ref 0.1–0.9)
MONOCYTES NFR BLD AUTO: 4.5 % (ref 5–12)
NEUTROPHILS NFR BLD AUTO: 6.4 10*3/MM3 (ref 1.7–7)
NEUTROPHILS NFR BLD AUTO: 69.7 % (ref 42.7–76)
NRBC BLD AUTO-RTO: 0.1 /100 WBC (ref 0–0.2)
PLATELET # BLD AUTO: 250 10*3/MM3 (ref 140–450)
PMV BLD AUTO: 9.5 FL (ref 6–12)
POTASSIUM SERPL-SCNC: 3.9 MMOL/L (ref 3.5–5.2)
PROT SERPL-MCNC: 7.1 G/DL (ref 6–8.5)
PROTHROMBIN TIME: 10.3 SECONDS (ref 9.6–11.7)
RBC # BLD AUTO: 4.27 10*6/MM3 (ref 3.77–5.28)
SODIUM SERPL-SCNC: 139 MMOL/L (ref 136–145)
TROPONIN T SERPL-MCNC: <0.01 NG/ML (ref 0–0.03)
TSH SERPL DL<=0.05 MIU/L-ACNC: 1.28 UIU/ML (ref 0.27–4.2)
WBC NRBC COR # BLD: 9.1 10*3/MM3 (ref 3.4–10.8)

## 2022-12-29 PROCEDURE — 99284 EMERGENCY DEPT VISIT MOD MDM: CPT

## 2022-12-29 PROCEDURE — 85610 PROTHROMBIN TIME: CPT | Performed by: NURSE PRACTITIONER

## 2022-12-29 PROCEDURE — 80050 GENERAL HEALTH PANEL: CPT | Performed by: NURSE PRACTITIONER

## 2022-12-29 PROCEDURE — 84484 ASSAY OF TROPONIN QUANT: CPT | Performed by: NURSE PRACTITIONER

## 2022-12-29 PROCEDURE — 70450 CT HEAD/BRAIN W/O DYE: CPT

## 2022-12-29 PROCEDURE — 71045 X-RAY EXAM CHEST 1 VIEW: CPT

## 2022-12-29 PROCEDURE — 93005 ELECTROCARDIOGRAM TRACING: CPT | Performed by: NURSE PRACTITIONER

## 2022-12-29 RX ORDER — SODIUM CHLORIDE 0.9 % (FLUSH) 0.9 %
10 SYRINGE (ML) INJECTION AS NEEDED
Status: DISCONTINUED | OUTPATIENT
Start: 2022-12-29 | End: 2022-12-29 | Stop reason: HOSPADM

## 2022-12-29 NOTE — ED PROVIDER NOTES
Subjective   History of Present Illness  27-year-old female presents with complaints of diffuse lips, left upper and lower extremity \"numbness and tingling\" since Saturday while lying in bed.  She reports intermittent lasting approximately 5 to 10 minutes.  She reports she does have some improvement with moving.  She denies associated chest pain dyspnea dizziness syncope.  Denies headache slurred speech gait difficulty.  She denies similar episode.  She denies smoking EtOH illicit drug use.    Last menstrual period 3 weeks ago, tubal ligation.    No primary care established    1. Location: Upper and lower lips, left upper extremity, left lower extremity  2. Quality: Numb, tingly  3. Severity: Moderate  4. Worsening factors: Denies  5. Alleviating factors: Movement  6. Onset: 5 days  7. Radiation: Diffuse  8. Frequency: Intermittent  9. Co-morbidities: No past medical history on file.      History provided by:  Patient   used: No        Review of Systems   Constitutional: Negative for chills, diaphoresis and fever.   HENT: Negative for ear discharge and rhinorrhea.    Eyes: Negative for photophobia, pain and visual disturbance.   Respiratory: Negative.    Cardiovascular: Negative.    Gastrointestinal: Negative.    Genitourinary: Negative.    Musculoskeletal: Negative for gait problem.   Skin: Negative for color change, pallor and rash.   Allergic/Immunologic: Negative.    Neurological: Positive for numbness. Negative for dizziness, speech difficulty, weakness and headaches.   Psychiatric/Behavioral: Negative for agitation and confusion.   All other systems reviewed and are negative.      No past medical history on file.    No Known Allergies    No past surgical history on file.    Family History   Problem Relation Age of Onset   • No Known Problems Father        Social History     Socioeconomic History   • Marital status:      Spouse name: Xavier   • Number of children: 1   Tobacco Use   •  Smoking status: Never   • Smokeless tobacco: Never   Vaping Use   • Vaping Use: Never used   Substance and Sexual Activity   • Alcohol use: Defer   • Drug use: Never   • Sexual activity: Yes     Partners: Male           Objective   Physical Exam  Vitals and nursing note reviewed.   Constitutional:       General: She is awake. She is not in acute distress.     Appearance: Normal appearance. She is well-developed and normal weight. She is not ill-appearing or diaphoretic.   HENT:      Head: Normocephalic and atraumatic.      Right Ear: External ear normal.      Left Ear: External ear normal.      Nose: Nose normal.   Eyes:      Extraocular Movements: Extraocular movements intact.      Conjunctiva/sclera: Conjunctivae normal.      Pupils: Pupils are equal, round, and reactive to light.   Neck:      Trachea: Trachea and phonation normal.      Meningeal: Brudzinski's sign and Kernig's sign absent.   Cardiovascular:      Rate and Rhythm: Normal rate and regular rhythm.      Pulses: Normal pulses.      Heart sounds: Normal heart sounds, S1 normal and S2 normal. Heart sounds not distant. No murmur heard.    No friction rub. No gallop.   Pulmonary:      Effort: Pulmonary effort is normal.      Breath sounds: Normal breath sounds and air entry.   Musculoskeletal:         General: Normal range of motion.      Cervical back: Full passive range of motion without pain, normal range of motion and neck supple. No erythema or rigidity. No spinous process tenderness. Normal range of motion.   Skin:     General: Skin is warm and dry.      Capillary Refill: Capillary refill takes less than 2 seconds.   Neurological:      Mental Status: She is alert and oriented to person, place, and time.      GCS: GCS eye subscore is 4. GCS verbal subscore is 5. GCS motor subscore is 6.      Cranial Nerves: Cranial nerves 2-12 are intact. No cranial nerve deficit.      Sensory: Sensation is intact. No sensory deficit.      Motor: Motor function is  intact. No abnormal muscle tone.      Coordination: Coordination is intact.      Gait: Gait is intact.   Psychiatric:         Mood and Affect: Mood normal.         Behavior: Behavior normal. Behavior is cooperative.         Thought Content: Thought content normal.         Judgment: Judgment normal.         Procedures     Sinus rhythm no ectopy rate of 77.  No previous available for comparison. Interpreted by Dr. Rosenberg and reviewed by me.       ED Course    CT Head Without Contrast    Result Date: 12/29/2022  No acute intracranial abnormality.  Electronically Signed By-Pancho Francisco MD On:12/29/2022 7:07 PM This report was finalized on [id number] by  Pancho Francisco MD.    XR Chest 1 View    Result Date: 12/29/2022  No acute process.  Electronically Signed By-Pancho Francisco MD On:12/29/2022 6:59 PM This report was finalized on [id number] by  Pancho Francisco MD.    Medications   sodium chloride 0.9 % flush 10 mL (has no administration in time range)     Labs Reviewed   COMPREHENSIVE METABOLIC PANEL - Abnormal; Notable for the following components:       Result Value    Glucose 108 (*)     All other components within normal limits    Narrative:     GFR Normal >60  Chronic Kidney Disease <60  Kidney Failure <15     CBC WITH AUTO DIFFERENTIAL - Abnormal; Notable for the following components:    Monocyte % 4.5 (*)     All other components within normal limits   PROTIME-INR - Normal   TROPONIN (IN-HOUSE) - Normal    Narrative:     Troponin T Reference Range:  <= 0.03 ng/mL-   Negative for AMI  >0.03 ng/mL-     Abnormal for myocardial necrosis.  Clinicians would have to utilize clinical acumen, EKG, Troponin and serial changes to determine if it is an Acute Myocardial Infarction or myocardial injury due to an underlying chronic condition.       Results may be falsely decreased if patient taking Biotin.     TSH - Normal   CBC AND DIFFERENTIAL    Narrative:     The following orders were created for panel order CBC &  Differential.  Procedure                               Abnormality         Status                     ---------                               -----------         ------                     CBC Auto Differential[[id number]]        Abnormal            Final result                 Please view results for these tests on the individual orders.                                            Medical Decision Making  Chart Review: 8/16/2021 patient was seen by general surgery for right breast abscess with mastitis.  Comorbidity: No past medical history on file.  Imaging: Was interpreted by physician and reviewed by myself: CT Head Without Contrast   Final Result    No acute intracranial abnormality.          Electronically Signed By-Pancho Francisco MD On:12/29/2022 7:07 PM    This report was finalized on [id number] by  Pancho Francisco MD.     XR Chest 1 View   Final Result    No acute process.          Electronically Signed By-Pancho Francisco MD On:12/29/2022 6:59 PM    This report was finalized on [id number] by  Pancho Francisco MD.    Patient undressed and placed in gown for exam.  Appropriate PPE worn during patient exam. Patient is alert and oriented x3. Nothing focal on exam. S1-S2 heart sounds on exam.  Lungs are clear to auscultation. No edema noted to the bilateral lower extremities.  IV established and labs obtained.  Cardiac and neuro work-ups initiated.  Chest x-ray and CT Head without obtained with the above findings. Work-up was found to be unremarkable.  Patient was given referral for primary care as well as neurology.  Upon reassessment, she is sitting upright in bed in no acute distress.  Her vital signs are stable.    Disposition/Treatment: Discussed results with patient, verbalized understanding.  Discussed reasons to return to the ER, patient verbalized understanding.  Agreeable with plan of care.  Patient was stable upon discharge.       Part of this note may be an electronic transcription/translation of spoken  language to printed text using the Dragon Dictation System.       Amount and/or Complexity of Data Reviewed  Labs: ordered. Decision-making details documented in ED Course.  Radiology: ordered. Decision-making details documented in ED Course.  ECG/medicine tests: ordered. Decision-making details documented in ED Course.      Risk  Prescription drug management.          Final diagnoses:   Paresthesia of left arm and leg   Numbness of lip       ED Disposition  ED Disposition     ED Disposition   Discharge    Condition   Stable    Comment   --             PATIENT CONNECTION - [facility] [address] [phone]  Schedule an appointment as soon as possible for a visit       Brooklynn Sebastian, APRN  [address] [id number] [phone]    Schedule an appointment as soon as possible for a visit       [facility] EMERGENCY DEPARTMENT  [address] [facility] [address] [phone]  Go to   If symptoms worsen         Medication List      No changes were made to your prescriptions during this visit.          Marianne Muller, APRN  12/29/22 2000

## 2022-12-29 NOTE — ED NOTES
Patient reports numbness in left arm and lips that occasionally is in left leg as well. Patient reports this started on Saturday. Patient denies weakness, headache, or pain. Patient denies any changes in speech or swallowing.

## 2022-12-30 LAB — QT INTERVAL: 370 MS

## 2022-12-30 NOTE — DISCHARGE INSTRUCTIONS
It is important for you to establish primary care provider for primary care needs.  Call patient connection to establish care and schedule follow-up.  Schedule appointment with neurology for further work-up.  Return to the ER for new or worsening symptoms.

## 2025-03-18 ENCOUNTER — PATIENT ROUNDING (BHMG ONLY) (OUTPATIENT)
Dept: FAMILY MEDICINE CLINIC | Facility: CLINIC | Age: 30
End: 2025-03-18
Payer: COMMERCIAL

## 2025-03-18 ENCOUNTER — OFFICE VISIT (OUTPATIENT)
Dept: FAMILY MEDICINE CLINIC | Facility: CLINIC | Age: 30
End: 2025-03-18
Payer: COMMERCIAL

## 2025-03-18 VITALS
WEIGHT: 131 LBS | OXYGEN SATURATION: 100 % | TEMPERATURE: 97.7 F | HEART RATE: 70 BPM | HEIGHT: 60 IN | BODY MASS INDEX: 25.72 KG/M2 | SYSTOLIC BLOOD PRESSURE: 118 MMHG | DIASTOLIC BLOOD PRESSURE: 74 MMHG

## 2025-03-18 DIAGNOSIS — D64.9 ANEMIA, UNSPECIFIED TYPE: ICD-10-CM

## 2025-03-18 DIAGNOSIS — Z00.00 ROUTINE GENERAL MEDICAL EXAMINATION AT A HEALTH CARE FACILITY: Primary | ICD-10-CM

## 2025-03-18 DIAGNOSIS — Z12.4 SCREENING FOR CERVICAL CANCER: ICD-10-CM

## 2025-03-18 PROCEDURE — 99385 PREV VISIT NEW AGE 18-39: CPT | Performed by: NURSE PRACTITIONER

## 2025-03-18 NOTE — PROGRESS NOTES
Subjective   Karen Coronado is a 29 y.o. female presents for   Chief Complaint   Patient presents with    [facility]       Health Maintenance Due   Topic Date Due    BMI FOLLOWUP  Never done    HEPATITIS C SCREENING  Never done    ANNUAL PHYSICAL  Never done    PAP SMEAR  Never done       History of Present Illness  The patient is a 29-year-old female who presents to [facility].    She has expressed interest in undergoing a Pap smear during this visit. Her previous gynecological care was provided by [facility] in [place], where she initially consulted with a physician before transitioning to a nurse practitioner due to the physician's departure. She has no history of abnormal Pap smears. She is not currently using any form of birth control, having undergone a tubal ligation following the birth of her child. This is her only surgical history. She reports no significant health issues in the past.    Her last laboratory tests were conducted during an emergency room visit in 2022, at which time she was experiencing numbness and tingling. An MRI was scheduled but not performed due to scheduling conflicts. The symptoms have since resolved and have not recurred.    She has been receiving healthcare services through a free clinic associated with her partner's workplace, where her iron levels were monitored every 6 weeks due to low levels. Her liver enzymes were also found to be elevated. It has been approximately 6 months since her last iron level check. She experiences persistent coldness in her hands and feet. She was prescribed an iron supplement but admits to inconsistent use due to a general aversion to medication.    She has not received any COVID-19 vaccines. She is up to date on all other vaccines and does not want influenza vaccine today.    MEDICATIONS  Iron supplement (noncompliant)    IMMUNIZATIONS  She has not received any COVID-19 vaccines. She is up to date on all other vaccines and  "does not want influenza vaccine today.    Vitals:    03/18/25 1312   BP: 118/74   BP Location: Right arm   Patient Position: Sitting   Cuff Size: Adult   Pulse: 70   Temp: 97.7 °F (36.5 °C)   TempSrc: Infrared   SpO2: 100%   Weight: 59.4 kg (131 lb)   Height: 152.4 cm (60\")     Body mass index is 25.58 kg/m².    Current Outpatient Medications on File Prior to Visit   Medication Sig Dispense Refill    ferrous sulfate 325 (65 FE) MG tablet Take 1 tablet by mouth Daily With Breakfast.      [DISCONTINUED] Prenatal Vit-Fe Fumarate-FA (prenatal vitamin 27-0.8) 27-0.8 MG tablet tablet Take 1 tablet by mouth Daily. (Patient not taking: Reported on 3/18/2025)       No current facility-administered medications on file prior to visit.       The following portions of the patient's history were reviewed and updated as appropriate: allergies, current medications, past family history, past medical history, past social history, past surgical history, and problem list.    Review of Systems    Objective   Physical Exam  Vitals and nursing note reviewed.   Constitutional:       Appearance: Normal appearance. She is well-developed.   HENT:      Head: Normocephalic and atraumatic.      Right Ear: External ear normal.      Left Ear: External ear normal.      Nose: Nose normal.   Eyes:      Extraocular Movements: Extraocular movements intact.      Pupils: Pupils are equal, round, and reactive to light.   Cardiovascular:      Rate and Rhythm: Normal rate and regular rhythm.      Pulses: Normal pulses.      Heart sounds: Normal heart sounds.   Pulmonary:      Effort: Pulmonary effort is normal.      Breath sounds: Normal breath sounds.   Chest:   Breasts:     Right: Normal.      Left: Normal.   Abdominal:      General: Bowel sounds are normal.      Palpations: Abdomen is soft.      Hernia: There is no hernia in the left inguinal area or right inguinal area.   Genitourinary:     Exam position: Lithotomy position.      Vagina: Normal.      " Cervix: Normal.      Uterus: Normal.       Adnexa: Right adnexa normal and left adnexa normal.      Rectum: Normal.   Musculoskeletal:         General: Normal range of motion.      Cervical back: Normal range of motion and neck supple.   Lymphadenopathy:      Lower Body: No right inguinal adenopathy. No left inguinal adenopathy.   Skin:     General: Skin is warm and dry.   Neurological:      General: No focal deficit present.      Mental Status: She is alert and oriented to person, place, and time.   Psychiatric:         Mood and Affect: Mood normal.         Behavior: Behavior normal.         Judgment: Judgment normal.              PHQ-9 Total Score:      Results      Assessment & Plan   Diagnoses and all orders for this visit:    1. Routine general medical examination at a health care facility (Primary)  Comments:  Patient counseled on importance of balanced diet and routine exercise as well as risk and benefits of current recommended vaccines.  Orders:  -     CBC Auto Differential  -     Comprehensive Metabolic Panel  -     Lipid Panel  -     TSH    2. Anemia, unspecified type  -     Iron and TIBC    3. Screening for cervical cancer  -     IGP,Aptima HPV,Age Gdln         1. Health maintenance.  A comprehensive set of laboratory tests will be conducted today, including CBC, CMP, cholesterol, thyroid, and iron levels. A Pap smear will also be performed during this visit.    2. Low iron levels.  She reported previously low iron levels and inconsistent use of iron supplements. Iron levels will be checked today. If results indicate low iron, she will be advised to resume iron supplementation and discuss strategies to improve adherence.    3. Encounter for immunization.  She has not received any COVID-19 vaccines. She is up to date on all other vaccines and does not want influenza vaccine today. She was informed that COVID-19 vaccines are available at the health department if she decides to get  vaccinated.    Follow-up  The patient will follow up in 1 year for annual physical or sooner if needed.    PROCEDURE  The patient underwent a tubal ligation following the birth of her child.    There are no Patient Instructions on file for this visit.         Patient or patient representative verbalized consent for the use of Ambient Listening during the visit with  ENRIQUE Raphael for chart documentation. 3/18/2025  14:24 EDT

## 2025-03-18 NOTE — PROGRESS NOTES
March 18, 2025    Hello, may I speak with Karen Coronado?    My name is FADI      I am  with [facility]  [address]  [place] [address].    Before we get started may I verify your date of birth? 1995    I am calling to officially welcome you to our practice and ask about your recent visit. Is this a good time to talk? yes    Tell me about your visit with us. What things went well?  VISIT WAS GOOD       We're always looking for ways to make our patients' experiences even better. Do you have recommendations on ways we may improve?  no    Overall were you satisfied with your first visit to our practice? yes       I appreciate you taking the time to speak with me today. Is there anything else I can do for you? no      Thank you, and have a great day.

## 2025-03-19 LAB
ALBUMIN SERPL-MCNC: 4.5 G/DL (ref 3.9–4.9)
ALP SERPL-CCNC: 62 IU/L (ref 44–121)
ALT SERPL-CCNC: 10 IU/L (ref 0–32)
AST SERPL-CCNC: 19 IU/L (ref 0–40)
BASOPHILS # BLD AUTO: 0 X10E3/UL (ref 0–0.2)
BASOPHILS NFR BLD AUTO: 1 %
BILIRUB SERPL-MCNC: 0.9 MG/DL (ref 0–1.2)
BUN SERPL-MCNC: 7 MG/DL (ref 6–20)
BUN/CREAT SERPL: 11 (ref 9–23)
CALCIUM SERPL-MCNC: 8.9 MG/DL (ref 8.7–10.2)
CHLORIDE SERPL-SCNC: 105 MMOL/L (ref 96–106)
CHOLEST SERPL-MCNC: 163 MG/DL (ref 100–199)
CO2 SERPL-SCNC: 19 MMOL/L (ref 20–29)
CREAT SERPL-MCNC: 0.65 MG/DL (ref 0.57–1)
EGFRCR SERPLBLD CKD-EPI 2021: 121 ML/MIN/1.73
EOSINOPHIL # BLD AUTO: 0.1 X10E3/UL (ref 0–0.4)
EOSINOPHIL NFR BLD AUTO: 1 %
ERYTHROCYTE [DISTWIDTH] IN BLOOD BY AUTOMATED COUNT: 12.3 % (ref 11.7–15.4)
GLOBULIN SER CALC-MCNC: 2.9 G/DL (ref 1.5–4.5)
GLUCOSE SERPL-MCNC: 86 MG/DL (ref 70–99)
HCT VFR BLD AUTO: 36.4 % (ref 34–46.6)
HDLC SERPL-MCNC: 60 MG/DL
HGB BLD-MCNC: 11.6 G/DL (ref 11.1–15.9)
IMM GRANULOCYTES # BLD AUTO: 0 X10E3/UL (ref 0–0.1)
IMM GRANULOCYTES NFR BLD AUTO: 0 %
IRON SATN MFR SERPL: 11 % (ref 15–55)
IRON SERPL-MCNC: 47 UG/DL (ref 27–159)
LDLC SERPL CALC-MCNC: 92 MG/DL (ref 0–99)
LYMPHOCYTES # BLD AUTO: 1.9 X10E3/UL (ref 0.7–3.1)
LYMPHOCYTES NFR BLD AUTO: 24 %
MCH RBC QN AUTO: 28.8 PG (ref 26.6–33)
MCHC RBC AUTO-ENTMCNC: 31.9 G/DL (ref 31.5–35.7)
MCV RBC AUTO: 90 FL (ref 79–97)
MONOCYTES # BLD AUTO: 0.4 X10E3/UL (ref 0.1–0.9)
MONOCYTES NFR BLD AUTO: 6 %
NEUTROPHILS # BLD AUTO: 5.5 X10E3/UL (ref 1.4–7)
NEUTROPHILS NFR BLD AUTO: 68 %
PLATELET # BLD AUTO: 250 X10E3/UL (ref 150–450)
POTASSIUM SERPL-SCNC: 4.5 MMOL/L (ref 3.5–5.2)
PROT SERPL-MCNC: 7.4 G/DL (ref 6–8.5)
RBC # BLD AUTO: 4.03 X10E6/UL (ref 3.77–5.28)
SODIUM SERPL-SCNC: 138 MMOL/L (ref 134–144)
TIBC SERPL-MCNC: 425 UG/DL (ref 250–450)
TRIGL SERPL-MCNC: 51 MG/DL (ref 0–149)
TSH SERPL DL<=0.005 MIU/L-ACNC: 1.63 UIU/ML (ref 0.45–4.5)
UIBC SERPL-MCNC: 378 UG/DL (ref 131–425)
VLDLC SERPL CALC-MCNC: 11 MG/DL (ref 5–40)
WBC # BLD AUTO: 8 X10E3/UL (ref 3.4–10.8)

## 2025-03-21 LAB
AGE GDLN ACOG TESTING: NORMAL
CYTOLOGIST CVX/VAG CYTO: NORMAL
CYTOLOGY CVX/VAG DOC CYTO: NORMAL
CYTOLOGY CVX/VAG DOC THIN PREP: NORMAL
DX ICD CODE: NORMAL
HPV GENOTYPE REFLEX: NORMAL
HPV I/H RISK 4 DNA CVX QL PROBE+SIG AMP: NEGATIVE
OTHER STN SPEC: NORMAL
SERVICE CMNT-IMP: NORMAL
STAT OF ADQ CVX/VAG CYTO-IMP: NORMAL

## 2025-08-01 ENCOUNTER — TELEPHONE (OUTPATIENT)
Dept: FAMILY MEDICINE CLINIC | Facility: CLINIC | Age: 30
End: 2025-08-01
Payer: COMMERCIAL

## 2025-08-01 ENCOUNTER — OFFICE VISIT (OUTPATIENT)
Dept: FAMILY MEDICINE CLINIC | Facility: CLINIC | Age: 30
End: 2025-08-01
Payer: COMMERCIAL

## 2025-08-01 VITALS
OXYGEN SATURATION: 99 % | HEART RATE: 76 BPM | WEIGHT: 131 LBS | SYSTOLIC BLOOD PRESSURE: 119 MMHG | HEIGHT: 60 IN | DIASTOLIC BLOOD PRESSURE: 76 MMHG | BODY MASS INDEX: 25.72 KG/M2 | TEMPERATURE: 96.9 F

## 2025-08-01 DIAGNOSIS — H93.8X1 SENSATION OF FULLNESS IN RIGHT EAR: ICD-10-CM

## 2025-08-01 DIAGNOSIS — D64.9 ANEMIA, UNSPECIFIED TYPE: ICD-10-CM

## 2025-08-01 DIAGNOSIS — R20.0 NUMBNESS OF LIP: Primary | ICD-10-CM

## 2025-08-01 DIAGNOSIS — R00.2 PALPITATIONS: ICD-10-CM

## 2025-08-01 RX ORDER — CETIRIZINE HYDROCHLORIDE 10 MG/1
10 TABLET ORAL DAILY
Qty: 30 TABLET | Refills: 2 | Status: SHIPPED | OUTPATIENT
Start: 2025-08-01

## 2025-08-01 RX ORDER — FLUTICASONE PROPIONATE 50 MCG
2 SPRAY, SUSPENSION (ML) NASAL DAILY
Qty: 16 G | Refills: 2 | Status: SHIPPED | OUTPATIENT
Start: 2025-08-01

## 2025-08-01 NOTE — TELEPHONE ENCOUNTER
"Patient called in and said that she is having face numbness and her left arm feels \"weird\". She also stated when she lays down she's having heart palpitations. She also stated that she felt dizzy earlier. Advised ER with those symptoms.  "

## 2025-08-01 NOTE — PROGRESS NOTES
Chief Complaint  Numbness and Ear Fullness    Lisandro Coronado presents to [facility]    History of Present Illness  The patient presents for evaluation of numbness and tingling in her upper lip and right nostril, clogged right ear, chest fluttering, and lightheadedness.    She has been experiencing intermittent numbness and tingling in her upper lip and right nostril for the past few weeks. There is no facial numbness or tingling. Additionally, she reports a sensation of clogging in her right ear but has no history of ear infections. She does not take any allergy medications and does not use Flonase. She had been experiencing pressure on the left side of her head, which has since resolved. Clear nasal drainage occurs every morning. There is no abnormal fatigue. She reports no dental issues and uses a nightly retainer. She has been using Invisalign for several years and does not believe it is causing her lip numbness.    She describes a fluttering sensation in the middle of her chest, primarily occurring at night when she lies down. This morning, she felt lightheaded and nauseous. There is no unusual stress or anxiety. There is no chest pain or heaviness. She is unaware of any heart conditions. She is currently taking a multivitamin with iron due to previously low iron levels. She is unsure about her magnesium or vitamin levels. She has been attempting to reduce her caffeine intake by alternating days without coffee, which seems to have helped with her palpitations. She last consumed coffee on 07/26/2025. She rarely drinks soda or energy drinks.    She had an episode of numbness in her left arm in 2022 or 2023, which was evaluated and found to be non-concerning. Prior to these symptoms, she experienced an eyelid twitch for 3 weeks, which has since resolved.    Social History:  Coffee/Tea/Caffeine-containing Drinks: She consumes coffee, attempting to reduce  "intake by alternating days without it.    Results      Ear Fullness  Associated symptoms: congestion        Objective   Vital Signs:  /76 (BP Location: Left arm, Patient Position: Sitting, Cuff Size: Adult)   Pulse 76   Temp 96.9 °F (36.1 °C) (Infrared)   Ht 152.4 cm (60\")   Wt 59.4 kg (131 lb)   SpO2 99%   BMI 25.58 kg/m²   Estimated body mass index is 25.58 kg/m² as calculated from the following:    Height as of this encounter: 152.4 cm (60\").    Weight as of this encounter: 59.4 kg (131 lb).          Review of Systems   HENT:  Positive for congestion.         Ear fullness  Upper lip numbness    Cardiovascular:  Positive for palpitations.        But has not had palpation x 1 week since cutting down on the caffeine    Neurological:  Positive for light-headedness.   All other systems reviewed and are negative.       Physical Exam  Vitals reviewed.   Constitutional:       Appearance: Normal appearance. She is normal weight.   HENT:      Right Ear: Tympanic membrane, ear canal and external ear normal.      Left Ear: Tympanic membrane, ear canal and external ear normal.      Ears:      Comments: Patient has feeling of fullness in her ear membrane itself in both ears both look good canal looks good external ear looks good no signs of ear infection or bulging of the eardrums     Nose: Congestion present.      Mouth/Throat:      Mouth: Mucous membranes are moist.      Pharynx: Oropharynx is clear.   Cardiovascular:      Rate and Rhythm: Normal rate and regular rhythm.      Pulses: Normal pulses.      Heart sounds: Normal heart sounds.   Pulmonary:      Effort: Pulmonary effort is normal.      Breath sounds: Normal breath sounds.   Skin:     General: Skin is warm and dry.   Neurological:      Mental Status: She is alert.   Psychiatric:         Mood and Affect: Mood normal.         Behavior: Behavior normal.         Thought Content: Thought content normal.         Judgment: Judgment normal.          Physical " Exam  Ears: Eardrum appears sunken in, suggesting eustachian tube dysfunction.  Respiratory: Clear to auscultation, no wheezing, rales or rhonchi    Result Review :           ECG 12 Lead    Date/Time: 8/1/2025 12:27 PM  Performed by: Kesha Yuan PA-C    Authorized by: Kesha Yuan PA-C  Comparison: not compared with previous ECG   Rhythm: sinus rhythm  Rate: normal  Conduction: conduction normal  ST Segments: ST segments normal  T Waves: T waves normal  QRS axis: normal  Other: no other findings    Clinical impression: normal ECG            Assessment and Plan   Diagnoses and all orders for this visit:    1. Numbness of lip (Primary)  -     CBC & Differential  -     Comprehensive Metabolic Panel  -     Magnesium  -     Ferritin    2. Anemia, unspecified type  -     CBC & Differential  -     Iron and TIBC  -     Comprehensive Metabolic Panel  -     Ferritin    3. Sensation of fullness in right ear  -     cetirizine (ZyrTEC Allergy) 10 MG tablet; Take 1 tablet by mouth Daily.  Dispense: 30 tablet; Refill: 2  -     fluticasone (FLONASE) 50 MCG/ACT nasal spray; Administer 2 sprays into the nostril(s) as directed by provider Daily.  Dispense: 16 g; Refill: 2    Other orders  -     ECG 12 Lead             Assessment & Plan  1. Eustachian tube dysfunction.  - Symptoms suggest possible eustachian tube dysfunction, likely due to pressure behind the ears.  - Physical exam revealed a slightly sunken eardrum, indicating pressure behind the ear.  - Daily use of Flonase, 2 sprays in each nostril, and cetirizine (Zyrtec) was recommended to help open up the eustachian tubes and equalize pressure.  - Prescriptions for Flonase and cetirizine were sent to the pharmacy. Adequate hydration was emphasized, with a minimum intake of 64 ounces of water daily.    2. Chest fluttering.  - The chest fluttering experienced at night may be related to caffeine intake.  - An EKG was performed today, which returned normal results.  -  Blood work, including CBC, CMP, ferritin, iron, and magnesium levels, will be conducted.  - Patient advised to monitor caffeine intake and reduce consumption to alleviate symptoms.    Follow-up: A follow-up visit is scheduled in 2 weeks.          Follow Up   Return in about 2 weeks (around 8/15/2025) for Recheck ear and numbness .  Patient was given instructions and counseling regarding her condition or for health maintenance advice. Please see specific information pulled into the AVS if appropriate.         This note has been electronically signed.   Kesha Yuan PA-C 12:28 EDT 08/01/25   Patient or patient representative verbalized consent for the use of Ambient Listening during the visit with  Kesha Yuan PA-C for chart documentation. 8/1/2025  12:28 EDT    The encounter note is created with the use of AI technology.  I do apologize if there are typos and/or confusion within the note.  Please feel free to contact me or my office with any questions or concerns.

## 2025-08-02 LAB
ALBUMIN SERPL-MCNC: 4.5 G/DL (ref 3.9–4.9)
ALP SERPL-CCNC: 61 IU/L (ref 44–121)
ALT SERPL-CCNC: 8 IU/L (ref 0–32)
AST SERPL-CCNC: 14 IU/L (ref 0–40)
BASOPHILS # BLD AUTO: 0 X10E3/UL (ref 0–0.2)
BASOPHILS NFR BLD AUTO: 1 %
BILIRUB SERPL-MCNC: 1.2 MG/DL (ref 0–1.2)
BUN SERPL-MCNC: 7 MG/DL (ref 6–20)
BUN/CREAT SERPL: 11 (ref 9–23)
CALCIUM SERPL-MCNC: 9.4 MG/DL (ref 8.7–10.2)
CHLORIDE SERPL-SCNC: 104 MMOL/L (ref 96–106)
CO2 SERPL-SCNC: 22 MMOL/L (ref 20–29)
CREAT SERPL-MCNC: 0.63 MG/DL (ref 0.57–1)
EGFRCR SERPLBLD CKD-EPI 2021: 121 ML/MIN/1.73
EOSINOPHIL # BLD AUTO: 0.1 X10E3/UL (ref 0–0.4)
EOSINOPHIL NFR BLD AUTO: 1 %
ERYTHROCYTE [DISTWIDTH] IN BLOOD BY AUTOMATED COUNT: 13.3 % (ref 11.7–15.4)
FERRITIN SERPL-MCNC: 20 NG/ML (ref 15–150)
GLOBULIN SER CALC-MCNC: 2.7 G/DL (ref 1.5–4.5)
GLUCOSE SERPL-MCNC: 92 MG/DL (ref 70–99)
HCT VFR BLD AUTO: 39 % (ref 34–46.6)
HGB BLD-MCNC: 12.6 G/DL (ref 11.1–15.9)
IMM GRANULOCYTES # BLD AUTO: 0 X10E3/UL (ref 0–0.1)
IMM GRANULOCYTES NFR BLD AUTO: 0 %
IRON SATN MFR SERPL: 35 % (ref 15–55)
IRON SERPL-MCNC: 124 UG/DL (ref 27–159)
LYMPHOCYTES # BLD AUTO: 1.2 X10E3/UL (ref 0.7–3.1)
LYMPHOCYTES NFR BLD AUTO: 21 %
MAGNESIUM SERPL-MCNC: 2.1 MG/DL (ref 1.6–2.3)
MCH RBC QN AUTO: 30.3 PG (ref 26.6–33)
MCHC RBC AUTO-ENTMCNC: 32.3 G/DL (ref 31.5–35.7)
MCV RBC AUTO: 94 FL (ref 79–97)
MONOCYTES # BLD AUTO: 0.3 X10E3/UL (ref 0.1–0.9)
MONOCYTES NFR BLD AUTO: 5 %
NEUTROPHILS # BLD AUTO: 4.2 X10E3/UL (ref 1.4–7)
NEUTROPHILS NFR BLD AUTO: 72 %
PLATELET # BLD AUTO: 243 X10E3/UL (ref 150–450)
POTASSIUM SERPL-SCNC: 4.3 MMOL/L (ref 3.5–5.2)
PROT SERPL-MCNC: 7.2 G/DL (ref 6–8.5)
RBC # BLD AUTO: 4.16 X10E6/UL (ref 3.77–5.28)
SODIUM SERPL-SCNC: 140 MMOL/L (ref 134–144)
TIBC SERPL-MCNC: 354 UG/DL (ref 250–450)
UIBC SERPL-MCNC: 230 UG/DL (ref 131–425)
WBC # BLD AUTO: 5.8 X10E3/UL (ref 3.4–10.8)

## 2025-08-15 ENCOUNTER — OFFICE VISIT (OUTPATIENT)
Dept: FAMILY MEDICINE CLINIC | Facility: CLINIC | Age: 30
End: 2025-08-15
Payer: COMMERCIAL

## 2025-08-15 VITALS
DIASTOLIC BLOOD PRESSURE: 78 MMHG | TEMPERATURE: 98 F | HEIGHT: 60 IN | HEART RATE: 69 BPM | WEIGHT: 134 LBS | SYSTOLIC BLOOD PRESSURE: 114 MMHG | OXYGEN SATURATION: 98 % | BODY MASS INDEX: 26.31 KG/M2

## 2025-08-15 DIAGNOSIS — R20.0 NUMBNESS OF LIP: Primary | ICD-10-CM

## 2025-08-15 DIAGNOSIS — H93.8X1 SENSATION OF FULLNESS IN RIGHT EAR: ICD-10-CM

## 2025-08-15 PROCEDURE — 99213 OFFICE O/P EST LOW 20 MIN: CPT | Performed by: PHYSICIAN ASSISTANT
